# Patient Record
Sex: FEMALE | Race: BLACK OR AFRICAN AMERICAN | NOT HISPANIC OR LATINO | URBAN - METROPOLITAN AREA
[De-identification: names, ages, dates, MRNs, and addresses within clinical notes are randomized per-mention and may not be internally consistent; named-entity substitution may affect disease eponyms.]

---

## 2017-08-03 VITALS
RESPIRATION RATE: 20 BRPM | DIASTOLIC BLOOD PRESSURE: 95 MMHG | WEIGHT: 248.9 LBS | TEMPERATURE: 98 F | HEIGHT: 64 IN | HEART RATE: 97 BPM | OXYGEN SATURATION: 97 % | SYSTOLIC BLOOD PRESSURE: 169 MMHG

## 2017-08-03 NOTE — PATIENT PROFILE ADULT. - TEACHING/LEARNING LEARNING PREFERENCES
individual instruction/verbal instruction/written material written material/skill demonstration/verbal instruction/individual instruction

## 2017-08-03 NOTE — PATIENT PROFILE ADULT. - PSH
Fibroid, uterine    H/O decompression of ulnar nerve Fibroid, uterine  REMOVED  H/O decompression of ulnar nerve  RIGHT ARM

## 2017-08-03 NOTE — PATIENT PROFILE ADULT. - VISION (WITH CORRECTIVE LENSES IF THE PATIENT USUALLY WEARS THEM):
Normal vision: sees adequately in most situations; can see medication labels, newsprint Partially impaired: cannot see medication labels or newsprint, but can see obstacles in path, and the surrounding layout; can count fingers at arm's length/GLASSES

## 2017-08-04 ENCOUNTER — INPATIENT (INPATIENT)
Facility: HOSPITAL | Age: 62
LOS: 1 days | Discharge: HOME HEALTH | DRG: 470 | End: 2017-08-06
Attending: SPECIALIST | Admitting: SPECIALIST
Payer: MEDICARE

## 2017-08-04 DIAGNOSIS — M17.11 UNILATERAL PRIMARY OSTEOARTHRITIS, RIGHT KNEE: ICD-10-CM

## 2017-08-04 DIAGNOSIS — Z98.890 OTHER SPECIFIED POSTPROCEDURAL STATES: Chronic | ICD-10-CM

## 2017-08-04 DIAGNOSIS — D25.9 LEIOMYOMA OF UTERUS, UNSPECIFIED: Chronic | ICD-10-CM

## 2017-08-04 LAB
BASOPHILS NFR BLD AUTO: 0.1 % — SIGNIFICANT CHANGE UP (ref 0–2)
EOSINOPHIL NFR BLD AUTO: 0 % — SIGNIFICANT CHANGE UP (ref 0–6)
HCT VFR BLD CALC: 38.3 % — SIGNIFICANT CHANGE UP (ref 34.5–45)
HGB BLD-MCNC: 12.5 G/DL — SIGNIFICANT CHANGE UP (ref 11.5–15.5)
LYMPHOCYTES # BLD AUTO: 12.2 % — LOW (ref 13–44)
MCHC RBC-ENTMCNC: 28.9 PG — SIGNIFICANT CHANGE UP (ref 27–34)
MCHC RBC-ENTMCNC: 32.6 G/DL — SIGNIFICANT CHANGE UP (ref 32–36)
MCV RBC AUTO: 88.7 FL — SIGNIFICANT CHANGE UP (ref 80–100)
MONOCYTES NFR BLD AUTO: 1.2 % — LOW (ref 2–14)
NEUTROPHILS NFR BLD AUTO: 86.5 % — HIGH (ref 43–77)
PLATELET # BLD AUTO: 289 K/UL — SIGNIFICANT CHANGE UP (ref 150–400)
RBC # BLD: 4.32 M/UL — SIGNIFICANT CHANGE UP (ref 3.8–5.2)
RBC # FLD: 13.8 % — SIGNIFICANT CHANGE UP (ref 10.3–16.9)
WBC # BLD: 8.8 K/UL — SIGNIFICANT CHANGE UP (ref 3.8–10.5)
WBC # FLD AUTO: 8.8 K/UL — SIGNIFICANT CHANGE UP (ref 3.8–10.5)

## 2017-08-04 PROCEDURE — 73560 X-RAY EXAM OF KNEE 1 OR 2: CPT | Mod: 26,RT

## 2017-08-04 RX ORDER — DOCUSATE SODIUM 100 MG
100 CAPSULE ORAL THREE TIMES A DAY
Qty: 0 | Refills: 0 | Status: DISCONTINUED | OUTPATIENT
Start: 2017-08-04 | End: 2017-08-06

## 2017-08-04 RX ORDER — KETOROLAC TROMETHAMINE 30 MG/ML
30 SYRINGE (ML) INJECTION EVERY 6 HOURS
Qty: 0 | Refills: 0 | Status: DISCONTINUED | OUTPATIENT
Start: 2017-08-04 | End: 2017-08-05

## 2017-08-04 RX ORDER — ASPIRIN/CALCIUM CARB/MAGNESIUM 324 MG
325 TABLET ORAL DAILY
Qty: 0 | Refills: 0 | Status: DISCONTINUED | OUTPATIENT
Start: 2017-08-04 | End: 2017-08-05

## 2017-08-04 RX ORDER — AMLODIPINE BESYLATE 2.5 MG/1
5 TABLET ORAL DAILY
Qty: 0 | Refills: 0 | Status: DISCONTINUED | OUTPATIENT
Start: 2017-08-04 | End: 2017-08-06

## 2017-08-04 RX ORDER — CEFAZOLIN SODIUM 1 G
1000 VIAL (EA) INJECTION EVERY 8 HOURS
Qty: 0 | Refills: 0 | Status: COMPLETED | OUTPATIENT
Start: 2017-08-04 | End: 2017-08-05

## 2017-08-04 RX ORDER — BUPIVACAINE 13.3 MG/ML
20 INJECTION, SUSPENSION, LIPOSOMAL INFILTRATION ONCE
Qty: 0 | Refills: 0 | Status: DISCONTINUED | OUTPATIENT
Start: 2017-08-04 | End: 2017-08-06

## 2017-08-04 RX ORDER — SENNA PLUS 8.6 MG/1
2 TABLET ORAL AT BEDTIME
Qty: 0 | Refills: 0 | Status: DISCONTINUED | OUTPATIENT
Start: 2017-08-04 | End: 2017-08-06

## 2017-08-04 RX ORDER — OXYCODONE HYDROCHLORIDE 5 MG/1
10 TABLET ORAL EVERY 4 HOURS
Qty: 0 | Refills: 0 | Status: DISCONTINUED | OUTPATIENT
Start: 2017-08-04 | End: 2017-08-06

## 2017-08-04 RX ORDER — MORPHINE SULFATE 50 MG/1
4 CAPSULE, EXTENDED RELEASE ORAL
Qty: 0 | Refills: 0 | Status: DISCONTINUED | OUTPATIENT
Start: 2017-08-04 | End: 2017-08-04

## 2017-08-04 RX ORDER — ACETAMINOPHEN 500 MG
650 TABLET ORAL EVERY 6 HOURS
Qty: 0 | Refills: 0 | Status: DISCONTINUED | OUTPATIENT
Start: 2017-08-04 | End: 2017-08-06

## 2017-08-04 RX ORDER — HYDROCHLOROTHIAZIDE 25 MG
25 TABLET ORAL DAILY
Qty: 0 | Refills: 0 | Status: DISCONTINUED | OUTPATIENT
Start: 2017-08-04 | End: 2017-08-06

## 2017-08-04 RX ORDER — OXYCODONE HYDROCHLORIDE 5 MG/1
20 TABLET ORAL ONCE
Qty: 0 | Refills: 0 | Status: DISCONTINUED | OUTPATIENT
Start: 2017-08-04 | End: 2017-08-04

## 2017-08-04 RX ORDER — POTASSIUM CHLORIDE 20 MEQ
10 PACKET (EA) ORAL DAILY
Qty: 0 | Refills: 0 | Status: DISCONTINUED | OUTPATIENT
Start: 2017-08-04 | End: 2017-08-06

## 2017-08-04 RX ORDER — ONDANSETRON 8 MG/1
4 TABLET, FILM COATED ORAL EVERY 6 HOURS
Qty: 0 | Refills: 0 | Status: DISCONTINUED | OUTPATIENT
Start: 2017-08-04 | End: 2017-08-06

## 2017-08-04 RX ORDER — OXYCODONE HYDROCHLORIDE 5 MG/1
5 TABLET ORAL EVERY 4 HOURS
Qty: 0 | Refills: 0 | Status: DISCONTINUED | OUTPATIENT
Start: 2017-08-04 | End: 2017-08-06

## 2017-08-04 RX ORDER — SODIUM CHLORIDE 9 MG/ML
1000 INJECTION, SOLUTION INTRAVENOUS
Qty: 0 | Refills: 0 | Status: DISCONTINUED | OUTPATIENT
Start: 2017-08-04 | End: 2017-08-06

## 2017-08-04 RX ORDER — MORPHINE SULFATE 50 MG/1
2 CAPSULE, EXTENDED RELEASE ORAL EVERY 4 HOURS
Qty: 0 | Refills: 0 | Status: DISCONTINUED | OUTPATIENT
Start: 2017-08-04 | End: 2017-08-06

## 2017-08-04 RX ADMIN — Medication 30 MILLIGRAM(S): at 21:45

## 2017-08-04 RX ADMIN — MORPHINE SULFATE 4 MILLIGRAM(S): 50 CAPSULE, EXTENDED RELEASE ORAL at 14:20

## 2017-08-04 RX ADMIN — MORPHINE SULFATE 4 MILLIGRAM(S): 50 CAPSULE, EXTENDED RELEASE ORAL at 14:21

## 2017-08-04 RX ADMIN — MORPHINE SULFATE 4 MILLIGRAM(S): 50 CAPSULE, EXTENDED RELEASE ORAL at 13:59

## 2017-08-04 RX ADMIN — SENNA PLUS 2 TABLET(S): 8.6 TABLET ORAL at 21:45

## 2017-08-04 RX ADMIN — OXYCODONE HYDROCHLORIDE 20 MILLIGRAM(S): 5 TABLET ORAL at 08:14

## 2017-08-04 RX ADMIN — Medication 100 MILLIGRAM(S): at 21:45

## 2017-08-04 RX ADMIN — OXYCODONE HYDROCHLORIDE 10 MILLIGRAM(S): 5 TABLET ORAL at 14:35

## 2017-08-04 RX ADMIN — MORPHINE SULFATE 4 MILLIGRAM(S): 50 CAPSULE, EXTENDED RELEASE ORAL at 13:40

## 2017-08-04 RX ADMIN — Medication 100 MILLIGRAM(S): at 17:05

## 2017-08-04 RX ADMIN — Medication 30 MILLIGRAM(S): at 22:00

## 2017-08-04 RX ADMIN — Medication 325 MILLIGRAM(S): at 21:45

## 2017-08-04 RX ADMIN — MORPHINE SULFATE 4 MILLIGRAM(S): 50 CAPSULE, EXTENDED RELEASE ORAL at 13:58

## 2017-08-04 NOTE — PHYSICAL THERAPY INITIAL EVALUATION ADULT - DIAGNOSIS, PT EVAL
4H: Impaired Joint Mobility, Motor Function, Muscle Performance, and Range of Motion Associated with Joint Arthroplasty
4H: Impaired Joint Mobility, Motor Function, Muscle Performance, and Range of Motion Associated with Joint Arthroplasty

## 2017-08-04 NOTE — H&P ADULT - NSHPLABSRESULTS_GEN_ALL_CORE
Preop CBC, BMP, PT/PTT/INR, UA - WNL per medical clearance   Preop EKG - normal sinus - reviewed by medical clearance   Preop CXR - normal chest - reviewed by medical clearance

## 2017-08-04 NOTE — H&P ADULT - NSHPPHYSICALEXAM_GEN_ALL_CORE
MSK:  +decreased ROM secondary to pain, bilateral knees  Quad/GS/TA/FHL/EHL motor strength 5/5 bilaterally.   Sensation intact and equal to distal bilateral LE.   DP pulses palpable, toes warm and well perfused, cap refill brisk.    Remainder of exam per medical clearance note

## 2017-08-04 NOTE — PHYSICAL THERAPY INITIAL EVALUATION ADULT - ADDITIONAL COMMENTS
Patient is independent with most activities but son helps out with some ADL's. Patient uses a RW as needed and has a flight of stairs to enter her apt.

## 2017-08-04 NOTE — PHYSICAL THERAPY INITIAL EVALUATION ADULT - CRITERIA FOR SKILLED THERAPEUTIC INTERVENTIONS
anticipated discharge recommendation/anticipated equipment needs at discharge/rehab potential/risk reduction/prevention/functional limitations in following categories/impairments found/therapy frequency/predicted duration of therapy intervention

## 2017-08-04 NOTE — H&P ADULT - PROBLEM SELECTOR PLAN 1
Admit to Orthopaedic Service.  Presents today for elective right total knee replacement, left knee steroid injection  Pt medically stable and cleared for procedure today by Dr. Nga Webb MD

## 2017-08-04 NOTE — PHYSICAL THERAPY INITIAL EVALUATION ADULT - PERTINENT HX OF CURRENT PROBLEM, REHAB EVAL
62F c/o right knee pain x 4 years that began spontaneously and without injury or trauma. Pt reports more mild pain in her left knee that started within the past few months. Pt's pain has progressively worsened without improvement and has become increasingly unresponsive to conservative management.
63 yo F with chronic progressively worsening right knee pain x 4years that began spontaneously without injury or trauma

## 2017-08-04 NOTE — PROGRESS NOTE ADULT - SUBJECTIVE AND OBJECTIVE BOX
Orthopaedics Post Op Check    Procedure: Right total knee replacement  Surgeon: Dr. Torrez     Pt comfortable, without complaints  Denies CP, SOB, N/V, numbness/tingling     Vital Signs Last 24 Hrs  T(C): 35.9 (04 Aug 2017 12:40), Max: 36.4 (04 Aug 2017 08:30)  T(F): 96.7 (04 Aug 2017 12:40), Max: 97.6 (04 Aug 2017 08:30)  HR: 96 (04 Aug 2017 14:10) (86 - 97)  BP: 162/86 (04 Aug 2017 14:10) (129/66 - 169/95)  BP(mean): --  RR: 23 (04 Aug 2017 14:10) (16 - 23)  SpO2: 95% (04 Aug 2017 14:10) (94% - 97%)  AVSS, NAD    Dressing C/D/I  General: Pt Alert and oriented     Pulses: DP pulses 2+ bilaterally  Sensation: in tact to distal bilateral lower extremities; decreased sensation to right lower extremity at medial aspect of right foot  Motor: EHL/FHL/TA/GS 5/5 motor strength bilateral lower extremities     Post op XR: Right knee prosthesis in place     A/P: 62yFemale POD#0 s/p right TKR  - Stable  - Pain Control  - DVT ppx:  BID  - Post op abx: Ancef  - PT, WBS: WBAT  - F/U AM Labs

## 2017-08-04 NOTE — H&P ADULT - HISTORY OF PRESENT ILLNESS
62F c/o right knee pain x 4 years that began spontaneously and without injury or trauma. Pt reports more mild pain in her left knee that started within the past few months. Pt's pain has progressively worsened without improvement and has become increasingly unresponsive to conservative management. Pt reports frequent cramping in her right lower extremity. Pt ambulates without the use of an assistive device at baseline. Denies numbness/tingling/weakness of extremities. Denies h/o DVT. Denies fever, chills, CP, SOB, N/V today.     Presents today for elective right total knee replacement, left knee steroid injection.

## 2017-08-04 NOTE — PHYSICAL THERAPY INITIAL EVALUATION ADULT - MANUAL MUSCLE TESTING RESULTS, REHAB EVAL
grossly assessed due to/UE and LE grossly >3+/5 noted by ability to complete functional mobility tasks

## 2017-08-05 LAB
ANION GAP SERPL CALC-SCNC: 14 MMOL/L — SIGNIFICANT CHANGE UP (ref 5–17)
BUN SERPL-MCNC: 12 MG/DL — SIGNIFICANT CHANGE UP (ref 7–23)
CALCIUM SERPL-MCNC: 9.1 MG/DL — SIGNIFICANT CHANGE UP (ref 8.4–10.5)
CHLORIDE SERPL-SCNC: 100 MMOL/L — SIGNIFICANT CHANGE UP (ref 96–108)
CO2 SERPL-SCNC: 26 MMOL/L — SIGNIFICANT CHANGE UP (ref 22–31)
CREAT SERPL-MCNC: 0.7 MG/DL — SIGNIFICANT CHANGE UP (ref 0.5–1.3)
GLUCOSE SERPL-MCNC: 142 MG/DL — HIGH (ref 70–99)
HCT VFR BLD CALC: 35.6 % — SIGNIFICANT CHANGE UP (ref 34.5–45)
HGB BLD-MCNC: 11.6 G/DL — SIGNIFICANT CHANGE UP (ref 11.5–15.5)
MCHC RBC-ENTMCNC: 28.8 PG — SIGNIFICANT CHANGE UP (ref 27–34)
MCHC RBC-ENTMCNC: 32.6 G/DL — SIGNIFICANT CHANGE UP (ref 32–36)
MCV RBC AUTO: 88.3 FL — SIGNIFICANT CHANGE UP (ref 80–100)
MRSA PCR RESULT.: NEGATIVE — SIGNIFICANT CHANGE UP
PLATELET # BLD AUTO: 301 K/UL — SIGNIFICANT CHANGE UP (ref 150–400)
POTASSIUM SERPL-MCNC: 3.8 MMOL/L — SIGNIFICANT CHANGE UP (ref 3.5–5.3)
POTASSIUM SERPL-SCNC: 3.8 MMOL/L — SIGNIFICANT CHANGE UP (ref 3.5–5.3)
RBC # BLD: 4.03 M/UL — SIGNIFICANT CHANGE UP (ref 3.8–5.2)
RBC # FLD: 13.9 % — SIGNIFICANT CHANGE UP (ref 10.3–16.9)
S AUREUS DNA NOSE QL NAA+PROBE: NEGATIVE — SIGNIFICANT CHANGE UP
SODIUM SERPL-SCNC: 140 MMOL/L — SIGNIFICANT CHANGE UP (ref 135–145)
WBC # BLD: 11.4 K/UL — HIGH (ref 3.8–10.5)
WBC # FLD AUTO: 11.4 K/UL — HIGH (ref 3.8–10.5)

## 2017-08-05 RX ORDER — POTASSIUM CHLORIDE 20 MEQ
20 PACKET (EA) ORAL ONCE
Qty: 0 | Refills: 0 | Status: COMPLETED | OUTPATIENT
Start: 2017-08-05 | End: 2017-08-05

## 2017-08-05 RX ORDER — ASPIRIN/CALCIUM CARB/MAGNESIUM 324 MG
325 TABLET ORAL
Qty: 0 | Refills: 0 | Status: DISCONTINUED | OUTPATIENT
Start: 2017-08-05 | End: 2017-08-06

## 2017-08-05 RX ADMIN — MORPHINE SULFATE 2 MILLIGRAM(S): 50 CAPSULE, EXTENDED RELEASE ORAL at 18:19

## 2017-08-05 RX ADMIN — Medication 100 MILLIGRAM(S): at 20:16

## 2017-08-05 RX ADMIN — OXYCODONE HYDROCHLORIDE 5 MILLIGRAM(S): 5 TABLET ORAL at 08:44

## 2017-08-05 RX ADMIN — Medication 20 MILLIEQUIVALENT(S): at 20:47

## 2017-08-05 RX ADMIN — Medication 1 TABLET(S): at 12:06

## 2017-08-05 RX ADMIN — Medication 325 MILLIGRAM(S): at 12:06

## 2017-08-05 RX ADMIN — Medication 100 MILLIGRAM(S): at 01:10

## 2017-08-05 RX ADMIN — OXYCODONE HYDROCHLORIDE 5 MILLIGRAM(S): 5 TABLET ORAL at 09:44

## 2017-08-05 RX ADMIN — OXYCODONE HYDROCHLORIDE 5 MILLIGRAM(S): 5 TABLET ORAL at 16:22

## 2017-08-05 RX ADMIN — Medication 100 MILLIGRAM(S): at 05:38

## 2017-08-05 RX ADMIN — SENNA PLUS 2 TABLET(S): 8.6 TABLET ORAL at 20:17

## 2017-08-05 RX ADMIN — OXYCODONE HYDROCHLORIDE 10 MILLIGRAM(S): 5 TABLET ORAL at 20:03

## 2017-08-05 RX ADMIN — Medication 100 MILLIGRAM(S): at 13:26

## 2017-08-05 RX ADMIN — Medication 25 MILLIGRAM(S): at 05:38

## 2017-08-05 RX ADMIN — Medication 650 MILLIGRAM(S): at 17:28

## 2017-08-05 RX ADMIN — Medication 650 MILLIGRAM(S): at 18:28

## 2017-08-05 RX ADMIN — OXYCODONE HYDROCHLORIDE 10 MILLIGRAM(S): 5 TABLET ORAL at 19:02

## 2017-08-05 RX ADMIN — MORPHINE SULFATE 2 MILLIGRAM(S): 50 CAPSULE, EXTENDED RELEASE ORAL at 18:47

## 2017-08-05 RX ADMIN — Medication 10 MILLIEQUIVALENT(S): at 12:07

## 2017-08-05 RX ADMIN — Medication 30 MILLIGRAM(S): at 05:58

## 2017-08-05 RX ADMIN — Medication 325 MILLIGRAM(S): at 20:17

## 2017-08-05 RX ADMIN — AMLODIPINE BESYLATE 5 MILLIGRAM(S): 2.5 TABLET ORAL at 05:38

## 2017-08-05 RX ADMIN — Medication 30 MILLIGRAM(S): at 05:40

## 2017-08-05 RX ADMIN — OXYCODONE HYDROCHLORIDE 5 MILLIGRAM(S): 5 TABLET ORAL at 15:22

## 2017-08-05 NOTE — PROGRESS NOTE ADULT - SUBJECTIVE AND OBJECTIVE BOX
POST OPERATIVE DAY #:  1  STATUS POST:  Right  TKA                        SUBJECTIVE: Patient seen and examined. Doing well. Walked with PT yesterday      OBJECTIVE:     Vital Signs Last 24 Hrs  T(C): 35.8 (05 Aug 2017 05:30), Max: 36.6 (05 Aug 2017 00:15)  T(F): 96.5 (05 Aug 2017 05:30), Max: 97.9 (05 Aug 2017 00:15)  HR: 70 (05 Aug 2017 05:30) (70 - 97)  BP: 153/74 (05 Aug 2017 05:30) (129/66 - 183/94)  BP(mean): --  RR: 18 (05 Aug 2017 05:30) (16 - 23)  SpO2: 99% (05 Aug 2017 05:30) (94% - 99%)    Affected extremity:          Dressing: [ ] clean/dry/intact  [ ] Other:           Sensation: [ ] intact to light touch  [ ] decreased:          Motor exam: [  ] / 5 Tibialis Anterior/Gastrocnemius-Soleus          [ ] warm well perfused; capillary refill <3 seconds     LABS:                        12.5   8.8   )-----------( 289      ( 04 Aug 2017 13:50 )             38.3                 MEDICATIONS:BUpivacaine liposome 1.3% Injectable (no eMAR) 20 milliLiter(s) Local Injection once  lactated ringers. 1000 milliLiter(s) IV Continuous <Continuous>  acetaminophen   Tablet 650 milliGRAM(s) Oral every 6 hours PRN  acetaminophen   Tablet. 650 milliGRAM(s) Oral every 6 hours PRN  oxyCODONE    IR 5 milliGRAM(s) Oral every 4 hours PRN  oxyCODONE    IR 10 milliGRAM(s) Oral every 4 hours PRN  morphine  - Injectable 2 milliGRAM(s) IV Push every 4 hours PRN  aluminum hydroxide/magnesium hydroxide/simethicone Suspension 30 milliLiter(s) Oral four times a day PRN  ondansetron Injectable 4 milliGRAM(s) IV Push every 6 hours PRN  senna 2 Tablet(s) Oral at bedtime PRN  docusate sodium 100 milliGRAM(s) Oral three times a day  multivitamin 1 Tablet(s) Oral daily  aspirin enteric coated 325 milliGRAM(s) Oral daily  amLODIPine   Tablet 5 milliGRAM(s) Oral daily  hydrochlorothiazide 25 milliGRAM(s) Oral daily  potassium chloride    Tablet ER 10 milliEquivalent(s) Oral daily    Anticoagulation:  aspirin enteric coated 325 milliGRAM(s) Oral daily      Antibiotics:       Pain medications:   acetaminophen   Tablet 650 milliGRAM(s) Oral every 6 hours PRN  acetaminophen   Tablet. 650 milliGRAM(s) Oral every 6 hours PRN  oxyCODONE    IR 5 milliGRAM(s) Oral every 4 hours PRN  oxyCODONE    IR 10 milliGRAM(s) Oral every 4 hours PRN  morphine  - Injectable 2 milliGRAM(s) IV Push every 4 hours PRN  ondansetron Injectable 4 milliGRAM(s) IV Push every 6 hours PRN      RADIOLOGY & ADDITIONAL STUDIES:    A/P :   s/p right total knee replacement  -    Pain control  -    DVT ppx: [id number]   -    Periop abx:  Ancef  -    Weight bearing status: WBAT   -    Resume home meds  -    Physical Therapy  -    Dispo: Home

## 2017-08-06 ENCOUNTER — TRANSCRIPTION ENCOUNTER (OUTPATIENT)
Age: 62
End: 2017-08-06

## 2017-08-06 VITALS
DIASTOLIC BLOOD PRESSURE: 85 MMHG | OXYGEN SATURATION: 97 % | TEMPERATURE: 97 F | SYSTOLIC BLOOD PRESSURE: 185 MMHG | RESPIRATION RATE: 15 BRPM | HEART RATE: 76 BPM

## 2017-08-06 LAB
ANION GAP SERPL CALC-SCNC: 13 MMOL/L — SIGNIFICANT CHANGE UP (ref 5–17)
BUN SERPL-MCNC: 14 MG/DL — SIGNIFICANT CHANGE UP (ref 7–23)
CALCIUM SERPL-MCNC: 9 MG/DL — SIGNIFICANT CHANGE UP (ref 8.4–10.5)
CHLORIDE SERPL-SCNC: 98 MMOL/L — SIGNIFICANT CHANGE UP (ref 96–108)
CO2 SERPL-SCNC: 29 MMOL/L — SIGNIFICANT CHANGE UP (ref 22–31)
CREAT SERPL-MCNC: 0.9 MG/DL — SIGNIFICANT CHANGE UP (ref 0.5–1.3)
GLUCOSE SERPL-MCNC: 146 MG/DL — HIGH (ref 70–99)
HCT VFR BLD CALC: 35.1 % — SIGNIFICANT CHANGE UP (ref 34.5–45)
HGB BLD-MCNC: 11.5 G/DL — SIGNIFICANT CHANGE UP (ref 11.5–15.5)
MCHC RBC-ENTMCNC: 29.2 PG — SIGNIFICANT CHANGE UP (ref 27–34)
MCHC RBC-ENTMCNC: 32.8 G/DL — SIGNIFICANT CHANGE UP (ref 32–36)
MCV RBC AUTO: 89.1 FL — SIGNIFICANT CHANGE UP (ref 80–100)
PLATELET # BLD AUTO: 276 K/UL — SIGNIFICANT CHANGE UP (ref 150–400)
POTASSIUM SERPL-MCNC: 4.1 MMOL/L — SIGNIFICANT CHANGE UP (ref 3.5–5.3)
POTASSIUM SERPL-SCNC: 4.1 MMOL/L — SIGNIFICANT CHANGE UP (ref 3.5–5.3)
RBC # BLD: 3.94 M/UL — SIGNIFICANT CHANGE UP (ref 3.8–5.2)
RBC # FLD: 14.2 % — SIGNIFICANT CHANGE UP (ref 10.3–16.9)
SODIUM SERPL-SCNC: 140 MMOL/L — SIGNIFICANT CHANGE UP (ref 135–145)
WBC # BLD: 9.9 K/UL — SIGNIFICANT CHANGE UP (ref 3.8–10.5)
WBC # FLD AUTO: 9.9 K/UL — SIGNIFICANT CHANGE UP (ref 3.8–10.5)

## 2017-08-06 RX ORDER — MAGNESIUM HYDROXIDE 400 MG/1
30 TABLET, CHEWABLE ORAL DAILY
Qty: 0 | Refills: 0 | Status: DISCONTINUED | OUTPATIENT
Start: 2017-08-06 | End: 2017-08-06

## 2017-08-06 RX ORDER — OXYCODONE HYDROCHLORIDE 5 MG/1
1 TABLET ORAL
Qty: 42 | Refills: 0 | OUTPATIENT
Start: 2017-08-06 | End: 2017-08-13

## 2017-08-06 RX ORDER — MELOXICAM 15 MG/1
1 TABLET ORAL
Qty: 14 | Refills: 0 | OUTPATIENT
Start: 2017-08-06 | End: 2017-08-20

## 2017-08-06 RX ORDER — ASPIRIN/CALCIUM CARB/MAGNESIUM 324 MG
1 TABLET ORAL
Qty: 60 | Refills: 0 | OUTPATIENT
Start: 2017-08-06 | End: 2017-09-05

## 2017-08-06 RX ADMIN — OXYCODONE HYDROCHLORIDE 10 MILLIGRAM(S): 5 TABLET ORAL at 07:30

## 2017-08-06 RX ADMIN — Medication 325 MILLIGRAM(S): at 11:32

## 2017-08-06 RX ADMIN — OXYCODONE HYDROCHLORIDE 10 MILLIGRAM(S): 5 TABLET ORAL at 11:32

## 2017-08-06 RX ADMIN — Medication 100 MILLIGRAM(S): at 11:32

## 2017-08-06 RX ADMIN — Medication 1 TABLET(S): at 11:32

## 2017-08-06 RX ADMIN — OXYCODONE HYDROCHLORIDE 10 MILLIGRAM(S): 5 TABLET ORAL at 06:40

## 2017-08-06 RX ADMIN — AMLODIPINE BESYLATE 5 MILLIGRAM(S): 2.5 TABLET ORAL at 06:33

## 2017-08-06 RX ADMIN — Medication 25 MILLIGRAM(S): at 06:33

## 2017-08-06 RX ADMIN — OXYCODONE HYDROCHLORIDE 10 MILLIGRAM(S): 5 TABLET ORAL at 03:20

## 2017-08-06 RX ADMIN — Medication 10 MILLIEQUIVALENT(S): at 11:32

## 2017-08-06 RX ADMIN — Medication 100 MILLIGRAM(S): at 06:33

## 2017-08-06 RX ADMIN — OXYCODONE HYDROCHLORIDE 10 MILLIGRAM(S): 5 TABLET ORAL at 02:28

## 2017-08-06 NOTE — DISCHARGE NOTE ADULT - PATIENT PORTAL LINK FT
“You can access the [facility] Patient Portal, offered by [facility], by registering with the following website: [email or web address]”

## 2017-08-06 NOTE — PROGRESS NOTE ADULT - SUBJECTIVE AND OBJECTIVE BOX
POST OPERATIVE DAY #:  2  STATUS POST:  Right  TKA                        SUBJECTIVE: Patient seen and examined. Doing well. Walked with PT yesterday, expected to cleat PT today      OBJECTIVE:     Vital Signs Last 24 Hrs  T(C): 36.6 (06 Aug 2017 05:32), Max: 36.8 (05 Aug 2017 20:00)  T(F): 97.8 (06 Aug 2017 05:32), Max: 98.2 (05 Aug 2017 20:00)  HR: 74 (06 Aug 2017 05:32) (71 - 77)  BP: 159/91 (06 Aug 2017 05:32) (154/81 - 173/81)  BP(mean): --  RR: 18 (06 Aug 2017 05:32) (16 - 18)  SpO2: 97% (06 Aug 2017 05:32) (95% - 99%)    Affected extremity:          Dressing: [x] clean/dry/intact  [ ] Other:           Sensation: [x] intact to light touch  [ ] decreased:          Motor exam: [ 5] / 5 Tibialis Anterior/Gastrocnemius-Soleus          [x] warm well perfused; capillary refill <3 seconds     LABS:                                   11.5   9.9   )-----------( 276      ( 06 Aug 2017 06:56 )             35.1         08-06    140  |  98  |  14  ----------------------------<  146<H>  4.1   |  29  |  0.90    Ca    9.0      06 Aug 2017 06:56            MEDICATIONS:BUpivacaine liposome 1.3% Injectable (no eMAR) 20 milliLiter(s) Local Injection once  lactated ringers. 1000 milliLiter(s) IV Continuous <Continuous>  acetaminophen   Tablet 650 milliGRAM(s) Oral every 6 hours PRN  acetaminophen   Tablet. 650 milliGRAM(s) Oral every 6 hours PRN  oxyCODONE    IR 5 milliGRAM(s) Oral every 4 hours PRN  oxyCODONE    IR 10 milliGRAM(s) Oral every 4 hours PRN  morphine  - Injectable 2 milliGRAM(s) IV Push every 4 hours PRN  aluminum hydroxide/magnesium hydroxide/simethicone Suspension 30 milliLiter(s) Oral four times a day PRN  ondansetron Injectable 4 milliGRAM(s) IV Push every 6 hours PRN  senna 2 Tablet(s) Oral at bedtime PRN  docusate sodium 100 milliGRAM(s) Oral three times a day  multivitamin 1 Tablet(s) Oral daily  aspirin enteric coated 325 milliGRAM(s) Oral daily  amLODIPine   Tablet 5 milliGRAM(s) Oral daily  hydrochlorothiazide 25 milliGRAM(s) Oral daily  potassium chloride    Tablet ER 10 milliEquivalent(s) Oral daily    Anticoagulation:  aspirin enteric coated 325 milliGRAM(s) Oral daily      Antibiotics:       Pain medications:   acetaminophen   Tablet 650 milliGRAM(s) Oral every 6 hours PRN  acetaminophen   Tablet. 650 milliGRAM(s) Oral every 6 hours PRN  oxyCODONE    IR 5 milliGRAM(s) Oral every 4 hours PRN  oxyCODONE    IR 10 milliGRAM(s) Oral every 4 hours PRN  morphine  - Injectable 2 milliGRAM(s) IV Push every 4 hours PRN  ondansetron Injectable 4 milliGRAM(s) IV Push every 6 hours PRN      RADIOLOGY & ADDITIONAL STUDIES:    A/P :   s/p right total knee replacement POD 2  -    Pain control  -    DVT ppx: [id number]   -    Periop abx:  Ancef  -    Weight bearing status: WBAT   -    Resume home meds  -    Physical Therapy  -    Dispo: Home

## 2017-08-06 NOTE — DISCHARGE NOTE ADULT - PLAN OF CARE
Improved function and pain No strenuous activity, heavy lifting, driving or returning to work until cleared by MD.  You may shower - dressing is water-resistant, no soaking in bathtubs.  Remove dressing after post op day 5-7, then leave incision open to air. Keep incision clean and dry.  Try to have regular bowel movements, take stool softener or laxative if necessary.  May take Pepcid or Zantac for upset stomach.  May take Aleve or Naproxen instead of Meloxicam.  Swelling may travel all the way down leg to foot, this is normal and will subside in a few weeks.  Call to schedule an appt with  for follow up, if you have staples or sutures they will be removed in office.  Contact your doctor if you experience: fever greater than 101.5, chills, chest pain, difficulty breathing, redness or excessive drainage around the incision, other concerns.

## 2017-08-06 NOTE — DISCHARGE NOTE ADULT - MEDICATION SUMMARY - MEDICATIONS TO TAKE
I will START or STAY ON the medications listed below when I get home from the hospital:    Aspirin Enteric Coated 325 mg oral delayed release tablet  -- 1 tab(s) by mouth 2 times a day  -- Swallow whole.  Do not crush.  Take with food or milk.    -- Indication: For Anti coagulation    oxyCODONE 5 mg oral tablet  -- 1 tab(s) by mouth every 4 hours MDD:8  8  -- Caution federal law prohibits the transfer of this drug to any person other  than the person for whom it was prescribed.  It is very important that you take or use this exactly as directed.  Do not skip doses or discontinue unless directed by your doctor.  May cause drowsiness.  Alcohol may intensify this effect.  Use care when operating dangerous machinery.  This prescription cannot be refilled.  Using more of this medication than prescribed may cause serious breathing problems.    -- Indication: For severe pain    meloxicam 15 mg oral tablet  -- 1 tab(s) by mouth once a day MDD:1 as needed for moderate pain  -- Do not take this drug if you are pregnant.  Obtain medical advice before taking any non-prescription drugs as some may affect the action of this medication.  Take with food or milk.    -- Indication: For Moderate pain    amLODIPine 5 mg oral tablet  -- 1 tab(s) by mouth once a day  -- Indication: For High blood pressure    hydroCHLOROthiazide 25 mg oral tablet  -- 1 tab(s) by mouth once a day  -- Indication: For High blood pressure    potassium chloride 10 mEq oral tablet, extended release  --  by mouth once a day  -- Indication: For supplement

## 2017-08-06 NOTE — DISCHARGE NOTE ADULT - CARE PLAN
Principal Discharge DX:	Primary osteoarthritis of right knee  Goal:	Improved function and pain  Instructions for follow-up, activity and diet:	No strenuous activity, heavy lifting, driving or returning to work until cleared by MD.  You may shower - dressing is water-resistant, no soaking in bathtubs.  Remove dressing after post op day 5-7, then leave incision open to air. Keep incision clean and dry.  Try to have regular bowel movements, take stool softener or laxative if necessary.  May take Pepcid or Zantac for upset stomach.  May take Aleve or Naproxen instead of Meloxicam.  Swelling may travel all the way down leg to foot, this is normal and will subside in a few weeks.  Call to schedule an appt with  for follow up, if you have staples or sutures they will be removed in office.  Contact your doctor if you experience: fever greater than 101.5, chills, chest pain, difficulty breathing, redness or excessive drainage around the incision, other concerns.

## 2017-08-06 NOTE — DISCHARGE NOTE ADULT - CARE PROVIDER_API CALL
Apolinar Torrez), Orthopedics  [address] [place] [id number]  Phone: [phone]  Fax: [phone]

## 2017-08-08 DIAGNOSIS — M25.562 PAIN IN LEFT KNEE: ICD-10-CM

## 2017-08-08 DIAGNOSIS — E66.9 OBESITY, UNSPECIFIED: ICD-10-CM

## 2017-08-08 DIAGNOSIS — F32.9 MAJOR DEPRESSIVE DISORDER, SINGLE EPISODE, UNSPECIFIED: ICD-10-CM

## 2017-08-08 DIAGNOSIS — M17.11 UNILATERAL PRIMARY OSTEOARTHRITIS, RIGHT KNEE: ICD-10-CM

## 2017-08-08 DIAGNOSIS — F41.9 ANXIETY DISORDER, UNSPECIFIED: ICD-10-CM

## 2017-08-08 DIAGNOSIS — I10 ESSENTIAL (PRIMARY) HYPERTENSION: ICD-10-CM

## 2017-08-08 DIAGNOSIS — Z87.891 PERSONAL HISTORY OF NICOTINE DEPENDENCE: ICD-10-CM

## 2017-08-08 LAB — SURGICAL PATHOLOGY STUDY: SIGNIFICANT CHANGE UP

## 2017-08-08 PROCEDURE — 87641 MR-STAPH DNA AMP PROBE: CPT

## 2017-08-08 PROCEDURE — 86850 RBC ANTIBODY SCREEN: CPT

## 2017-08-08 PROCEDURE — 86901 BLOOD TYPING SEROLOGIC RH(D): CPT

## 2017-08-08 PROCEDURE — 73560 X-RAY EXAM OF KNEE 1 OR 2: CPT

## 2017-08-08 PROCEDURE — 88300 SURGICAL PATH GROSS: CPT

## 2017-08-08 PROCEDURE — C1713: CPT

## 2017-08-08 PROCEDURE — 97530 THERAPEUTIC ACTIVITIES: CPT

## 2017-08-08 PROCEDURE — 97116 GAIT TRAINING THERAPY: CPT

## 2017-08-08 PROCEDURE — 36415 COLL VENOUS BLD VENIPUNCTURE: CPT

## 2017-08-08 PROCEDURE — 85027 COMPLETE CBC AUTOMATED: CPT

## 2017-08-08 PROCEDURE — 97161 PT EVAL LOW COMPLEX 20 MIN: CPT

## 2017-08-08 PROCEDURE — 85025 COMPLETE CBC W/AUTO DIFF WBC: CPT

## 2017-08-08 PROCEDURE — C1776: CPT

## 2017-08-08 PROCEDURE — 80048 BASIC METABOLIC PNL TOTAL CA: CPT

## 2017-08-08 PROCEDURE — 86900 BLOOD TYPING SEROLOGIC ABO: CPT

## 2017-08-08 PROCEDURE — 97110 THERAPEUTIC EXERCISES: CPT

## 2017-12-28 PROBLEM — F41.9 ANXIETY DISORDER, UNSPECIFIED: Chronic | Status: ACTIVE | Noted: 2017-08-03

## 2017-12-28 PROBLEM — I10 ESSENTIAL (PRIMARY) HYPERTENSION: Chronic | Status: ACTIVE | Noted: 2017-08-03

## 2018-03-29 RX ORDER — OXYCODONE HYDROCHLORIDE 5 MG/1
20 TABLET ORAL ONCE
Qty: 0 | Refills: 0 | Status: DISCONTINUED | OUTPATIENT
Start: 2018-03-30 | End: 2018-03-30

## 2018-03-29 RX ORDER — AMLODIPINE BESYLATE 2.5 MG/1
1 TABLET ORAL
Qty: 0 | Refills: 0 | COMMUNITY

## 2018-03-29 NOTE — H&P ADULT - PSH
Fibroid, uterine  REMOVED  H/O decompression of ulnar nerve  RIGHT ARM  H/O total knee replacement, right Fibroid, uterine  REMOVED  H/O decompression of ulnar nerve  RIGHT ARM  H/O total knee replacement, right    History of total knee replacement, right

## 2018-03-29 NOTE — H&P ADULT - NSHPLABSRESULTS_GEN_ALL_CORE
preop ekg - SInus rhythm, non-specific ST changes per clearance    preop cbc, bmp, coags, UA WNL    CXR -WNL Preop cbc, bmp, pt/inr, ptt, ua wnl; nasal swab dos  preop cxr wnl per clearance  preop ekg wnl per clearance

## 2018-03-29 NOTE — H&P ADULT - PMH
Anxiety and depression    HTN (hypertension)    Obesity    Vitamin D deficiency Anxiety and depression    HTN (hypertension)    Obesity    Osteoarthritis of both knees    Vitamin D deficiency

## 2018-03-29 NOTE — PATIENT PROFILE ADULT. - PSH
Fibroid, uterine  REMOVED  H/O decompression of ulnar nerve  RIGHT ARM  H/O total knee replacement, right    History of total knee replacement, right

## 2018-03-29 NOTE — H&P ADULT - PROBLEM SELECTOR PLAN 1
Admit to ortho for LEft TKA surgery. Medically cleared. Admit to Ortho  For elective Left TKR  Cleared by Dr. Webb

## 2018-03-29 NOTE — PATIENT PROFILE ADULT. - PMH
Anxiety and depression    HTN (hypertension)    Obesity    Osteoarthritis of both knees    Vitamin D deficiency

## 2018-03-29 NOTE — H&P ADULT - NSHPPHYSICALEXAM_GEN_ALL_CORE
MSK:  limited ROM left knee secondary to pain    Rest of PE per medical clearance Left LE: Decreased ROM secondary to pain, sensation intact, DP 2+, brisk cap refill, EHL/FHL/TA/GS 5/5  Rest of PE per MD clearance

## 2018-03-29 NOTE — H&P ADULT - HISTORY OF PRESENT ILLNESS
62 y.o F with left knee pain x     Presents for elective left total knee replacement surgery. HAs had conservative treatment but still symptomatic. 62 y.o F with left knee pain worsened over time without improvement. Denies numbness, tingling. Ambulates with cane. Presents today for elective Left TKR.

## 2018-03-30 ENCOUNTER — INPATIENT (INPATIENT)
Facility: HOSPITAL | Age: 63
LOS: 2 days | Discharge: HOME HEALTH | DRG: 470 | End: 2018-04-02
Attending: SPECIALIST | Admitting: SPECIALIST
Payer: MEDICARE

## 2018-03-30 ENCOUNTER — RESULT REVIEW (OUTPATIENT)
Age: 63
End: 2018-03-30

## 2018-03-30 ENCOUNTER — TRANSCRIPTION ENCOUNTER (OUTPATIENT)
Age: 63
End: 2018-03-30

## 2018-03-30 VITALS
HEIGHT: 65 IN | WEIGHT: 252.43 LBS | RESPIRATION RATE: 18 BRPM | DIASTOLIC BLOOD PRESSURE: 82 MMHG | SYSTOLIC BLOOD PRESSURE: 134 MMHG | TEMPERATURE: 98 F | HEART RATE: 95 BPM

## 2018-03-30 DIAGNOSIS — Z96.651 PRESENCE OF RIGHT ARTIFICIAL KNEE JOINT: Chronic | ICD-10-CM

## 2018-03-30 DIAGNOSIS — M17.12 UNILATERAL PRIMARY OSTEOARTHRITIS, LEFT KNEE: ICD-10-CM

## 2018-03-30 DIAGNOSIS — I10 ESSENTIAL (PRIMARY) HYPERTENSION: ICD-10-CM

## 2018-03-30 DIAGNOSIS — D25.9 LEIOMYOMA OF UTERUS, UNSPECIFIED: Chronic | ICD-10-CM

## 2018-03-30 DIAGNOSIS — Z98.890 OTHER SPECIFIED POSTPROCEDURAL STATES: Chronic | ICD-10-CM

## 2018-03-30 DIAGNOSIS — F41.8 OTHER SPECIFIED ANXIETY DISORDERS: ICD-10-CM

## 2018-03-30 LAB
MRSA PCR RESULT.: NEGATIVE — SIGNIFICANT CHANGE UP
S AUREUS DNA NOSE QL NAA+PROBE: NEGATIVE — SIGNIFICANT CHANGE UP

## 2018-03-30 PROCEDURE — 73560 X-RAY EXAM OF KNEE 1 OR 2: CPT | Mod: 26,LT

## 2018-03-30 RX ORDER — PANTOPRAZOLE SODIUM 20 MG/1
40 TABLET, DELAYED RELEASE ORAL DAILY
Qty: 0 | Refills: 0 | Status: DISCONTINUED | OUTPATIENT
Start: 2018-03-30 | End: 2018-04-02

## 2018-03-30 RX ORDER — MORPHINE SULFATE 50 MG/1
2 CAPSULE, EXTENDED RELEASE ORAL
Qty: 0 | Refills: 0 | Status: DISCONTINUED | OUTPATIENT
Start: 2018-03-30 | End: 2018-04-02

## 2018-03-30 RX ORDER — BUPIVACAINE 13.3 MG/ML
20 INJECTION, SUSPENSION, LIPOSOMAL INFILTRATION ONCE
Qty: 0 | Refills: 0 | Status: DISCONTINUED | OUTPATIENT
Start: 2018-03-30 | End: 2018-04-02

## 2018-03-30 RX ORDER — HYDROMORPHONE HYDROCHLORIDE 2 MG/ML
0.5 INJECTION INTRAMUSCULAR; INTRAVENOUS; SUBCUTANEOUS
Qty: 0 | Refills: 0 | Status: DISCONTINUED | OUTPATIENT
Start: 2018-03-30 | End: 2018-04-02

## 2018-03-30 RX ORDER — DOCUSATE SODIUM 100 MG
100 CAPSULE ORAL THREE TIMES A DAY
Qty: 0 | Refills: 0 | Status: DISCONTINUED | OUTPATIENT
Start: 2018-03-30 | End: 2018-04-02

## 2018-03-30 RX ORDER — ASPIRIN/CALCIUM CARB/MAGNESIUM 324 MG
325 TABLET ORAL
Qty: 0 | Refills: 0 | Status: DISCONTINUED | OUTPATIENT
Start: 2018-03-30 | End: 2018-04-02

## 2018-03-30 RX ORDER — SODIUM CHLORIDE 9 MG/ML
1000 INJECTION, SOLUTION INTRAVENOUS
Qty: 0 | Refills: 0 | Status: DISCONTINUED | OUTPATIENT
Start: 2018-03-30 | End: 2018-04-02

## 2018-03-30 RX ORDER — HYDROMORPHONE HYDROCHLORIDE 2 MG/ML
0.5 INJECTION INTRAMUSCULAR; INTRAVENOUS; SUBCUTANEOUS EVERY 4 HOURS
Qty: 0 | Refills: 0 | Status: DISCONTINUED | OUTPATIENT
Start: 2018-03-30 | End: 2018-03-30

## 2018-03-30 RX ORDER — MAGNESIUM HYDROXIDE 400 MG/1
30 TABLET, CHEWABLE ORAL DAILY
Qty: 0 | Refills: 0 | Status: DISCONTINUED | OUTPATIENT
Start: 2018-03-30 | End: 2018-04-02

## 2018-03-30 RX ORDER — OXYCODONE HYDROCHLORIDE 5 MG/1
10 TABLET ORAL EVERY 12 HOURS
Qty: 0 | Refills: 0 | Status: DISCONTINUED | OUTPATIENT
Start: 2018-03-30 | End: 2018-04-02

## 2018-03-30 RX ORDER — POLYETHYLENE GLYCOL 3350 17 G/17G
17 POWDER, FOR SOLUTION ORAL DAILY
Qty: 0 | Refills: 0 | Status: DISCONTINUED | OUTPATIENT
Start: 2018-03-30 | End: 2018-04-02

## 2018-03-30 RX ORDER — KETOROLAC TROMETHAMINE 30 MG/ML
15 SYRINGE (ML) INJECTION EVERY 8 HOURS
Qty: 0 | Refills: 0 | Status: DISCONTINUED | OUTPATIENT
Start: 2018-03-30 | End: 2018-03-31

## 2018-03-30 RX ORDER — CEFAZOLIN SODIUM 1 G
2000 VIAL (EA) INJECTION EVERY 8 HOURS
Qty: 0 | Refills: 0 | Status: COMPLETED | OUTPATIENT
Start: 2018-03-30 | End: 2018-03-30

## 2018-03-30 RX ORDER — OXYCODONE HYDROCHLORIDE 5 MG/1
10 TABLET ORAL EVERY 4 HOURS
Qty: 0 | Refills: 0 | Status: DISCONTINUED | OUTPATIENT
Start: 2018-03-30 | End: 2018-04-02

## 2018-03-30 RX ORDER — ONDANSETRON 8 MG/1
4 TABLET, FILM COATED ORAL EVERY 6 HOURS
Qty: 0 | Refills: 0 | Status: DISCONTINUED | OUTPATIENT
Start: 2018-03-30 | End: 2018-04-02

## 2018-03-30 RX ORDER — OXYCODONE HYDROCHLORIDE 5 MG/1
5 TABLET ORAL EVERY 4 HOURS
Qty: 0 | Refills: 0 | Status: DISCONTINUED | OUTPATIENT
Start: 2018-03-30 | End: 2018-04-02

## 2018-03-30 RX ORDER — ACETAMINOPHEN 500 MG
650 TABLET ORAL EVERY 6 HOURS
Qty: 0 | Refills: 0 | Status: DISCONTINUED | OUTPATIENT
Start: 2018-03-30 | End: 2018-04-02

## 2018-03-30 RX ORDER — SENNA PLUS 8.6 MG/1
2 TABLET ORAL AT BEDTIME
Qty: 0 | Refills: 0 | Status: DISCONTINUED | OUTPATIENT
Start: 2018-03-30 | End: 2018-04-02

## 2018-03-30 RX ADMIN — Medication 15 MILLIGRAM(S): at 21:40

## 2018-03-30 RX ADMIN — OXYCODONE HYDROCHLORIDE 10 MILLIGRAM(S): 5 TABLET ORAL at 18:29

## 2018-03-30 RX ADMIN — Medication 15 MILLIGRAM(S): at 21:25

## 2018-03-30 RX ADMIN — Medication 100 MILLIGRAM(S): at 23:55

## 2018-03-30 RX ADMIN — HYDROMORPHONE HYDROCHLORIDE 0.5 MILLIGRAM(S): 2 INJECTION INTRAMUSCULAR; INTRAVENOUS; SUBCUTANEOUS at 12:05

## 2018-03-30 RX ADMIN — MORPHINE SULFATE 2 MILLIGRAM(S): 50 CAPSULE, EXTENDED RELEASE ORAL at 11:51

## 2018-03-30 RX ADMIN — Medication 100 MILLIGRAM(S): at 15:49

## 2018-03-30 RX ADMIN — SODIUM CHLORIDE 100 MILLILITER(S): 9 INJECTION, SOLUTION INTRAVENOUS at 11:51

## 2018-03-30 RX ADMIN — OXYCODONE HYDROCHLORIDE 10 MILLIGRAM(S): 5 TABLET ORAL at 21:37

## 2018-03-30 RX ADMIN — OXYCODONE HYDROCHLORIDE 10 MILLIGRAM(S): 5 TABLET ORAL at 17:35

## 2018-03-30 RX ADMIN — Medication 15 MILLIGRAM(S): at 13:40

## 2018-03-30 RX ADMIN — Medication 100 MILLIGRAM(S): at 21:26

## 2018-03-30 RX ADMIN — Medication 325 MILLIGRAM(S): at 17:35

## 2018-03-30 RX ADMIN — OXYCODONE HYDROCHLORIDE 20 MILLIGRAM(S): 5 TABLET ORAL at 06:37

## 2018-03-30 RX ADMIN — OXYCODONE HYDROCHLORIDE 10 MILLIGRAM(S): 5 TABLET ORAL at 20:37

## 2018-03-30 RX ADMIN — HYDROMORPHONE HYDROCHLORIDE 0.5 MILLIGRAM(S): 2 INJECTION INTRAMUSCULAR; INTRAVENOUS; SUBCUTANEOUS at 12:35

## 2018-03-30 RX ADMIN — Medication 15 MILLIGRAM(S): at 13:55

## 2018-03-30 NOTE — DISCHARGE NOTE ADULT - ADDITIONAL INSTRUCTIONS
No strenuous activity, heavy lifting, driving or returning to work until cleared by MD.  You may shower - dressing is water-resistant, no soaking in bathtubs.  Remove dressing after post op day 5-7, then leave incision open to air. Keep incision clean and dry.  Try to have regular bowel movements, take stool softener or laxative if necessary.  May take Pepcid or Zantac for upset stomach.  May take Aleve or Naproxen instead of Meloxicam.  Swelling may travel all the way down leg to foot, this is normal and will subside in a few weeks.  Call to schedule an appointment with Dr. Torrez for follow up, if you have staples or sutures they will be removed in office.  Contact your doctor if you experience: fever greater than 101.5, chills, chest pain, difficulty breathing, redness or excessive drainage around the incision, other concerns.  Follow up with your primary care provider. You are weight bearing as tolerated on left leg with assistive device.  No strenuous activity, heavy lifting, driving or returning to work until cleared by MD.  You have a RONY incisional drain to help promote incisional healing. Keep on until the follow up appointment. When the battery griffith down, hold the orange button to turn off the device. The RONY cannot wet so sponge bathe only until your follow up appointment with Dr. Torrez. Keep incision clean and dry.  Try to have regular bowel movements, take stool softener or laxative if necessary.  May take Pepcid or Zantac for upset stomach.  May take Aleve or Naproxen instead of Meloxicam.  Swelling may travel all the way down leg to foot, this is normal and will subside in a few weeks.  Call to schedule an appointment with Dr. Torrez for follow up, if you have staples or sutures they will be removed in office.  Contact your doctor if you experience: fever greater than 101.5, chills, chest pain, difficulty breathing, redness or excessive drainage around the incision, other concerns.  Follow up with your primary care provider.

## 2018-03-30 NOTE — DISCHARGE NOTE ADULT - CARE PLAN
Principal Discharge DX:	Primary osteoarthritis of left knee  Goal:	Improvement after surgery.  Assessment and plan of treatment:	See below. Principal Discharge DX:	Primary osteoarthritis of left knee  Goal:	Improvement with ambulation and decreased pain after surgery  Assessment and plan of treatment:	See below

## 2018-03-30 NOTE — PHYSICAL THERAPY INITIAL EVALUATION ADULT - PLANNED THERAPY INTERVENTIONS, PT EVAL
transfer training/ROM/gait training/postural re-education/balance training/bed mobility training/strengthening

## 2018-03-30 NOTE — PHYSICAL THERAPY INITIAL EVALUATION ADULT - CRITERIA FOR SKILLED THERAPEUTIC INTERVENTIONS
risk reduction/prevention/rehab potential/anticipated discharge recommendation/predicted duration of therapy intervention/functional limitations in following categories/impairments found/therapy frequency

## 2018-03-30 NOTE — PHYSICAL THERAPY INITIAL EVALUATION ADULT - RANGE OF MOTION EXAMINATION, REHAB EVAL
Right LE ROM was WNL (within normal limits)/no ROM deficits were identified/bilateral upper extremity ROM was WNL (within normal limits)/left ROM 60 degrees no ROM deficits were identified/bilateral upper extremity ROM was WNL (within normal limits)/Right LE ROM was WNL (within normal limits)/left knee ROM 60 degrees

## 2018-03-30 NOTE — PROGRESS NOTE ADULT - SUBJECTIVE AND OBJECTIVE BOX
Orthopedics Post Op Check    Procedure: LEFT TKR	  Surgeon: JIHAN Archer. stable, c/o pain in right knee. Denies CP, SOB, N/V, numbness/tingling  in b/l les    Vital Signs Last 24 Hrs  T(C): 36.6 (30 Mar 2018 10:14), Max: 36.6 (30 Mar 2018 06:14)  T(F): 97.8 (30 Mar 2018 10:14), Max: 97.8 (30 Mar 2018 06:14)  HR: 96 (30 Mar 2018 12:43) (82 - 96)  BP: 169/77 (30 Mar 2018 12:43) (123/85 - 170/79)  BP(mean): 129 (30 Mar 2018 12:18) (90 - 129)  RR: 21 (30 Mar 2018 12:43) (15 - 38)  SpO2: 97% (30 Mar 2018 12:43) (92% - 97%)      Dressing C/D/I WITH CRYOCUFF/ RONY DRESSING     Pulses: DP/PT 2+ B/L LES  SLT:  INTACT B/L LES  Motor:  EHL/FHL/TA/GS  5/5 B/L LES     Post op XR: PROSTHESIS IN PLACE     A/P: 62yoFemale POD#0 s/p LEFT TKR   - Stable  - Pain Control  - DVT ppx:ASA  - Post op abx:ANCEF  - PT, WBS: WBAT B/L LES  - F/U AM Labs

## 2018-03-30 NOTE — DISCHARGE NOTE ADULT - NS AS ACTIVITY OBS
Do not make important decisions/sponge bathe only/Do not drive or operate machinery/No Heavy lifting/straining

## 2018-03-30 NOTE — PHYSICAL THERAPY INITIAL EVALUATION ADULT - PERTINENT HX OF CURRENT PROBLEM, REHAB EVAL
62 y.o F with left knee pain worsened over time without improvement. Denies numbness, tingling. Now s/p elective Left TKR.

## 2018-03-30 NOTE — BRIEF OPERATIVE NOTE - PROCEDURE
<<-----Click on this checkbox to enter Procedure Left total knee arthroplasty  03/30/2018    Active  MIGUEL A

## 2018-03-30 NOTE — DISCHARGE NOTE ADULT - HOSPITAL COURSE
Admitted  Surgery L TKA  Emily-op Antibiotics  Pain control  DVT prophylaxis  OOB/Physical Therapy Admitted  Surgery L TKA 3/30/18  Emily-op Antibiotics  Pain control  DVT prophylaxis  OOB/Physical Therapy Admitted 3/30/18  Surgery L TKA 3/30/18  Emily-op Antibiotics  Pain control  DVT prophylaxis  OOB/Physical Therapy

## 2018-03-30 NOTE — DISCHARGE NOTE ADULT - CARE PROVIDER_API CALL
Apolinar Torrez), Orthopedics  [address] [place] [id number]  Phone: [phone]  Fax: [phone]

## 2018-03-30 NOTE — DISCHARGE NOTE ADULT - MEDICATION SUMMARY - MEDICATIONS TO STOP TAKING
I will STOP taking the medications listed below when I get home from the hospital:    potassium chloride 10 mEq oral tablet, extended release  --  by mouth once a day    amoxicillin 500 mg oral capsule  -- 1 cap(s) by mouth 3 times a day

## 2018-03-30 NOTE — PHYSICAL THERAPY INITIAL EVALUATION ADULT - IMPAIRMENTS FOUND, PT EVAL
ROM/gait, locomotion, and balance/ergonomics and body mechanics/aerobic capacity/endurance/muscle strength/posture

## 2018-03-30 NOTE — DISCHARGE NOTE ADULT - PATIENT PORTAL LINK FT
You can access the [facility] Patient Portal, offered by [facility], by registering with the following website: [email or web address]

## 2018-03-30 NOTE — PHYSICAL THERAPY INITIAL EVALUATION ADULT - ADDITIONAL COMMENTS
Patient lives with he son  in the first floor walk-up building. Needs to negotiate total of 24 steps. Patient was modified independent, ambulated with cane before surgery. Patient lives with her son in first floor walk-up building. Needs to negotiate total of 24 steps. Patient was modified independent, ambulated with cane before surgery.

## 2018-03-30 NOTE — DISCHARGE NOTE ADULT - PLAN OF CARE
Improvement after surgery. See below. Improvement with ambulation and decreased pain after surgery See below

## 2018-03-30 NOTE — DISCHARGE NOTE ADULT - MEDICATION SUMMARY - MEDICATIONS TO TAKE
I will START or STAY ON the medications listed below when I get home from the hospital:    oxyCODONE-acetaminophen 5 mg-325 mg oral tablet  -- 1- 2 tab(s) by mouth every 4 hours as needed for moderate to severe pain MDD:6  -- Caution federal law prohibits the transfer of this drug to any person other  than the person for whom it was prescribed.  May cause drowsiness.  Alcohol may intensify this effect.  Use care when operating dangerous machinery.  This prescription cannot be refilled.  This product contains acetaminophen.  Do not use  with any other product containing acetaminophen to prevent possible liver damage.  Using more of this medication than prescribed may cause serious breathing problems.    -- Indication: For Moderate to severe pain    Ecotrin 325 mg oral delayed release tablet  -- 1 tab(s) by mouth 2 times a day   -- Swallow whole.  Do not crush.  Take with food or milk.    -- Indication: For Prevention of blood clots     amLODIPine 10 mg oral tablet  -- 1 tab(s) by mouth once a day  -- Indication: For Home med    hydroCHLOROthiazide 25 mg oral tablet  -- 1 tab(s) by mouth once a day  -- Indication: For Home med    senna oral tablet  -- 2 tab(s) by mouth once a day (at bedtime), As needed, Constipation  -- Indication: For constipation    polyethylene glycol 3350 oral powder for reconstitution  -- 17 gram(s) by mouth once a day  -- Indication: For constipation    docusate sodium 100 mg oral capsule  -- 1 cap(s) by mouth 3 times a day  -- Indication: For constipation    bisacodyl 10 mg rectal suppository  -- 1 suppository(ies) rectally once a day, As needed, If no bowel movement by postoperative day #2  -- Indication: For constipation

## 2018-03-31 LAB
ANION GAP SERPL CALC-SCNC: 11 MMOL/L — SIGNIFICANT CHANGE UP (ref 5–17)
BUN SERPL-MCNC: 10 MG/DL — SIGNIFICANT CHANGE UP (ref 7–23)
CALCIUM SERPL-MCNC: 8.6 MG/DL — SIGNIFICANT CHANGE UP (ref 8.4–10.5)
CHLORIDE SERPL-SCNC: 100 MMOL/L — SIGNIFICANT CHANGE UP (ref 96–108)
CO2 SERPL-SCNC: 26 MMOL/L — SIGNIFICANT CHANGE UP (ref 22–31)
CREAT SERPL-MCNC: 0.7 MG/DL — SIGNIFICANT CHANGE UP (ref 0.5–1.3)
GLUCOSE SERPL-MCNC: 154 MG/DL — HIGH (ref 70–99)
HCT VFR BLD CALC: 35.7 % — SIGNIFICANT CHANGE UP (ref 34.5–45)
HGB BLD-MCNC: 11.4 G/DL — LOW (ref 11.5–15.5)
MCHC RBC-ENTMCNC: 29.2 PG — SIGNIFICANT CHANGE UP (ref 27–34)
MCHC RBC-ENTMCNC: 31.9 G/DL — LOW (ref 32–36)
MCV RBC AUTO: 91.3 FL — SIGNIFICANT CHANGE UP (ref 80–100)
PLATELET # BLD AUTO: 266 K/UL — SIGNIFICANT CHANGE UP (ref 150–400)
POTASSIUM SERPL-MCNC: 3.5 MMOL/L — SIGNIFICANT CHANGE UP (ref 3.5–5.3)
POTASSIUM SERPL-SCNC: 3.5 MMOL/L — SIGNIFICANT CHANGE UP (ref 3.5–5.3)
RBC # BLD: 3.91 M/UL — SIGNIFICANT CHANGE UP (ref 3.8–5.2)
RBC # FLD: 14.3 % — SIGNIFICANT CHANGE UP (ref 10.3–16.9)
SODIUM SERPL-SCNC: 137 MMOL/L — SIGNIFICANT CHANGE UP (ref 135–145)
WBC # BLD: 8.5 K/UL — SIGNIFICANT CHANGE UP (ref 3.8–10.5)
WBC # FLD AUTO: 8.5 K/UL — SIGNIFICANT CHANGE UP (ref 3.8–10.5)

## 2018-03-31 RX ORDER — POTASSIUM CHLORIDE 20 MEQ
40 PACKET (EA) ORAL
Qty: 0 | Refills: 0 | Status: COMPLETED | OUTPATIENT
Start: 2018-03-31 | End: 2018-03-31

## 2018-03-31 RX ADMIN — OXYCODONE HYDROCHLORIDE 10 MILLIGRAM(S): 5 TABLET ORAL at 14:30

## 2018-03-31 RX ADMIN — Medication 40 MILLIEQUIVALENT(S): at 14:31

## 2018-03-31 RX ADMIN — OXYCODONE HYDROCHLORIDE 10 MILLIGRAM(S): 5 TABLET ORAL at 00:31

## 2018-03-31 RX ADMIN — Medication 100 MILLIGRAM(S): at 14:30

## 2018-03-31 RX ADMIN — OXYCODONE HYDROCHLORIDE 10 MILLIGRAM(S): 5 TABLET ORAL at 15:45

## 2018-03-31 RX ADMIN — OXYCODONE HYDROCHLORIDE 10 MILLIGRAM(S): 5 TABLET ORAL at 05:05

## 2018-03-31 RX ADMIN — Medication 325 MILLIGRAM(S): at 17:12

## 2018-03-31 RX ADMIN — Medication 15 MILLIGRAM(S): at 05:20

## 2018-03-31 RX ADMIN — OXYCODONE HYDROCHLORIDE 10 MILLIGRAM(S): 5 TABLET ORAL at 06:05

## 2018-03-31 RX ADMIN — OXYCODONE HYDROCHLORIDE 10 MILLIGRAM(S): 5 TABLET ORAL at 22:34

## 2018-03-31 RX ADMIN — PANTOPRAZOLE SODIUM 40 MILLIGRAM(S): 20 TABLET, DELAYED RELEASE ORAL at 14:30

## 2018-03-31 RX ADMIN — Medication 100 MILLIGRAM(S): at 05:05

## 2018-03-31 RX ADMIN — OXYCODONE HYDROCHLORIDE 10 MILLIGRAM(S): 5 TABLET ORAL at 06:12

## 2018-03-31 RX ADMIN — OXYCODONE HYDROCHLORIDE 10 MILLIGRAM(S): 5 TABLET ORAL at 19:33

## 2018-03-31 RX ADMIN — OXYCODONE HYDROCHLORIDE 10 MILLIGRAM(S): 5 TABLET ORAL at 17:12

## 2018-03-31 RX ADMIN — OXYCODONE HYDROCHLORIDE 10 MILLIGRAM(S): 5 TABLET ORAL at 10:32

## 2018-03-31 RX ADMIN — OXYCODONE HYDROCHLORIDE 10 MILLIGRAM(S): 5 TABLET ORAL at 23:34

## 2018-03-31 RX ADMIN — Medication 325 MILLIGRAM(S): at 05:05

## 2018-03-31 RX ADMIN — OXYCODONE HYDROCHLORIDE 10 MILLIGRAM(S): 5 TABLET ORAL at 18:12

## 2018-03-31 RX ADMIN — OXYCODONE HYDROCHLORIDE 10 MILLIGRAM(S): 5 TABLET ORAL at 11:30

## 2018-03-31 RX ADMIN — Medication 40 MILLIEQUIVALENT(S): at 08:49

## 2018-03-31 RX ADMIN — OXYCODONE HYDROCHLORIDE 10 MILLIGRAM(S): 5 TABLET ORAL at 18:33

## 2018-03-31 RX ADMIN — Medication 100 MILLIGRAM(S): at 22:34

## 2018-03-31 RX ADMIN — OXYCODONE HYDROCHLORIDE 10 MILLIGRAM(S): 5 TABLET ORAL at 07:12

## 2018-03-31 RX ADMIN — Medication 15 MILLIGRAM(S): at 05:05

## 2018-03-31 RX ADMIN — OXYCODONE HYDROCHLORIDE 10 MILLIGRAM(S): 5 TABLET ORAL at 01:31

## 2018-03-31 RX ADMIN — POLYETHYLENE GLYCOL 3350 17 GRAM(S): 17 POWDER, FOR SOLUTION ORAL at 14:30

## 2018-03-31 NOTE — PROGRESS NOTE ADULT - SUBJECTIVE AND OBJECTIVE BOX
S: Patient seen and examined. Pt. doing well, Pain endorsed but controlled. No acute events overnight.    Vital Signs Last 24 Hrs  T(C): 37 (31 Mar 2018 05:23), Max: 37 (31 Mar 2018 05:23)  T(F): 98.6 (31 Mar 2018 05:23), Max: 98.6 (31 Mar 2018 05:23)  HR: 90 (31 Mar 2018 05:23) (82 - 98)  BP: 152/77 (31 Mar 2018 05:23) (123/85 - 170/79)  BP(mean): 129 (30 Mar 2018 12:18) (90 - 129)  RR: 17 (31 Mar 2018 05:23) (15 - 38)  SpO2: 100% (31 Mar 2018 05:23) (92% - 100%)    NAD, AOx3, comfortable  Motor: 5/5 GS/TA/EHL/FHL    Sensation: SILT   Pulses: WWP, DP/PT 2+    Dressing: C/D/I          A/P:  62y Female s/p L TKA on 3/30         -Stable  -Pain Control  -PT/WBAT  -DVT ppx: ASA  -Advance diet as tolerated  -f/u AM labs  -Dispo: Home

## 2018-04-01 LAB
ANION GAP SERPL CALC-SCNC: 10 MMOL/L — SIGNIFICANT CHANGE UP (ref 5–17)
BUN SERPL-MCNC: 12 MG/DL — SIGNIFICANT CHANGE UP (ref 7–23)
CALCIUM SERPL-MCNC: 8.8 MG/DL — SIGNIFICANT CHANGE UP (ref 8.4–10.5)
CHLORIDE SERPL-SCNC: 98 MMOL/L — SIGNIFICANT CHANGE UP (ref 96–108)
CO2 SERPL-SCNC: 30 MMOL/L — SIGNIFICANT CHANGE UP (ref 22–31)
CREAT SERPL-MCNC: 0.89 MG/DL — SIGNIFICANT CHANGE UP (ref 0.5–1.3)
GLUCOSE SERPL-MCNC: 147 MG/DL — HIGH (ref 70–99)
HCT VFR BLD CALC: 34.2 % — LOW (ref 34.5–45)
HGB BLD-MCNC: 10.7 G/DL — LOW (ref 11.5–15.5)
MCHC RBC-ENTMCNC: 28.4 PG — SIGNIFICANT CHANGE UP (ref 27–34)
MCHC RBC-ENTMCNC: 31.3 G/DL — LOW (ref 32–36)
MCV RBC AUTO: 90.7 FL — SIGNIFICANT CHANGE UP (ref 80–100)
PLATELET # BLD AUTO: 257 K/UL — SIGNIFICANT CHANGE UP (ref 150–400)
POTASSIUM SERPL-MCNC: 4.2 MMOL/L — SIGNIFICANT CHANGE UP (ref 3.5–5.3)
POTASSIUM SERPL-SCNC: 4.2 MMOL/L — SIGNIFICANT CHANGE UP (ref 3.5–5.3)
RBC # BLD: 3.77 M/UL — LOW (ref 3.8–5.2)
RBC # FLD: 14 % — SIGNIFICANT CHANGE UP (ref 10.3–16.9)
SODIUM SERPL-SCNC: 138 MMOL/L — SIGNIFICANT CHANGE UP (ref 135–145)
WBC # BLD: 8.6 K/UL — SIGNIFICANT CHANGE UP (ref 3.8–10.5)
WBC # FLD AUTO: 8.6 K/UL — SIGNIFICANT CHANGE UP (ref 3.8–10.5)

## 2018-04-01 RX ORDER — POTASSIUM CHLORIDE 20 MEQ
10 PACKET (EA) ORAL DAILY
Qty: 0 | Refills: 0 | Status: DISCONTINUED | OUTPATIENT
Start: 2018-04-01 | End: 2018-04-02

## 2018-04-01 RX ORDER — AMLODIPINE BESYLATE 2.5 MG/1
10 TABLET ORAL DAILY
Qty: 0 | Refills: 0 | Status: DISCONTINUED | OUTPATIENT
Start: 2018-04-01 | End: 2018-04-02

## 2018-04-01 RX ORDER — HYDROCHLOROTHIAZIDE 25 MG
25 TABLET ORAL DAILY
Qty: 0 | Refills: 0 | Status: DISCONTINUED | OUTPATIENT
Start: 2018-04-01 | End: 2018-04-02

## 2018-04-01 RX ADMIN — OXYCODONE HYDROCHLORIDE 10 MILLIGRAM(S): 5 TABLET ORAL at 03:53

## 2018-04-01 RX ADMIN — OXYCODONE HYDROCHLORIDE 10 MILLIGRAM(S): 5 TABLET ORAL at 13:52

## 2018-04-01 RX ADMIN — Medication 10 MILLIEQUIVALENT(S): at 13:14

## 2018-04-01 RX ADMIN — OXYCODONE HYDROCHLORIDE 10 MILLIGRAM(S): 5 TABLET ORAL at 02:53

## 2018-04-01 RX ADMIN — OXYCODONE HYDROCHLORIDE 10 MILLIGRAM(S): 5 TABLET ORAL at 08:16

## 2018-04-01 RX ADMIN — OXYCODONE HYDROCHLORIDE 10 MILLIGRAM(S): 5 TABLET ORAL at 17:00

## 2018-04-01 RX ADMIN — PANTOPRAZOLE SODIUM 40 MILLIGRAM(S): 20 TABLET, DELAYED RELEASE ORAL at 13:14

## 2018-04-01 RX ADMIN — OXYCODONE HYDROCHLORIDE 10 MILLIGRAM(S): 5 TABLET ORAL at 12:13

## 2018-04-01 RX ADMIN — Medication 100 MILLIGRAM(S): at 05:30

## 2018-04-01 RX ADMIN — Medication 100 MILLIGRAM(S): at 13:14

## 2018-04-01 RX ADMIN — OXYCODONE HYDROCHLORIDE 10 MILLIGRAM(S): 5 TABLET ORAL at 16:11

## 2018-04-01 RX ADMIN — Medication 325 MILLIGRAM(S): at 05:30

## 2018-04-01 RX ADMIN — OXYCODONE HYDROCHLORIDE 10 MILLIGRAM(S): 5 TABLET ORAL at 05:29

## 2018-04-01 RX ADMIN — OXYCODONE HYDROCHLORIDE 10 MILLIGRAM(S): 5 TABLET ORAL at 17:47

## 2018-04-01 RX ADMIN — POLYETHYLENE GLYCOL 3350 17 GRAM(S): 17 POWDER, FOR SOLUTION ORAL at 13:14

## 2018-04-01 RX ADMIN — OXYCODONE HYDROCHLORIDE 10 MILLIGRAM(S): 5 TABLET ORAL at 16:28

## 2018-04-01 RX ADMIN — Medication 100 MILLIGRAM(S): at 21:05

## 2018-04-01 RX ADMIN — OXYCODONE HYDROCHLORIDE 10 MILLIGRAM(S): 5 TABLET ORAL at 22:02

## 2018-04-01 RX ADMIN — Medication 325 MILLIGRAM(S): at 17:00

## 2018-04-01 RX ADMIN — OXYCODONE HYDROCHLORIDE 10 MILLIGRAM(S): 5 TABLET ORAL at 06:29

## 2018-04-01 RX ADMIN — OXYCODONE HYDROCHLORIDE 10 MILLIGRAM(S): 5 TABLET ORAL at 21:06

## 2018-04-01 RX ADMIN — AMLODIPINE BESYLATE 10 MILLIGRAM(S): 2.5 TABLET ORAL at 06:25

## 2018-04-01 RX ADMIN — OXYCODONE HYDROCHLORIDE 10 MILLIGRAM(S): 5 TABLET ORAL at 09:16

## 2018-04-01 RX ADMIN — Medication 25 MILLIGRAM(S): at 06:25

## 2018-04-01 NOTE — PROGRESS NOTE ADULT - SUBJECTIVE AND OBJECTIVE BOX
S: Patient seen and examined. Pt. doing well, Pain endorsed but controlled. No acute events overnight.    Vital Signs Last 24 Hrs  T(C): 37.2 (01 Apr 2018 05:27), Max: 37.4 (31 Mar 2018 16:15)  T(F): 98.9 (01 Apr 2018 05:27), Max: 99.3 (31 Mar 2018 16:15)  HR: 99 (01 Apr 2018 05:27) (85 - 104)  BP: 160/88 (01 Apr 2018 05:27) (151/77 - 165/80)  BP(mean): --  RR: 16 (01 Apr 2018 05:27) (16 - 17)  SpO2: 95% (01 Apr 2018 05:27) (95% - 96%)    NAD, AOx3, comfortable  Motor: 5/5 GS/TA/EHL/FHL    Sensation: SILT   Pulses: WWP, DP/PT 2+    Dressing: C/D/I                          11.4   8.5   )-----------( 266      ( 31 Mar 2018 11:38 )             35.7         A/P:  62y Female s/p L TKA on 3/30         -Stable  -Pain Control  -PT/WBAT  -DVT ppx: ASA  -Advance diet as tolerated  -f/u AM labs  -Dispo: Home

## 2018-04-02 VITALS
TEMPERATURE: 99 F | SYSTOLIC BLOOD PRESSURE: 134 MMHG | RESPIRATION RATE: 16 BRPM | HEART RATE: 87 BPM | OXYGEN SATURATION: 96 % | DIASTOLIC BLOOD PRESSURE: 77 MMHG

## 2018-04-02 LAB — SURGICAL PATHOLOGY STUDY: SIGNIFICANT CHANGE UP

## 2018-04-02 RX ORDER — INFLUENZA VIRUS VACCINE 15; 15; 15; 15 UG/.5ML; UG/.5ML; UG/.5ML; UG/.5ML
0.5 SUSPENSION INTRAMUSCULAR ONCE
Qty: 0 | Refills: 0 | Status: COMPLETED | OUTPATIENT
Start: 2018-04-02 | End: 2018-04-02

## 2018-04-02 RX ORDER — OXYCODONE HYDROCHLORIDE 5 MG/1
1 TABLET ORAL
Qty: 0 | Refills: 0 | COMMUNITY

## 2018-04-02 RX ORDER — SENNA PLUS 8.6 MG/1
2 TABLET ORAL
Qty: 0 | Refills: 0 | COMMUNITY
Start: 2018-04-02

## 2018-04-02 RX ORDER — ASPIRIN/CALCIUM CARB/MAGNESIUM 324 MG
1 TABLET ORAL
Qty: 60 | Refills: 0 | OUTPATIENT
Start: 2018-04-02 | End: 2018-05-01

## 2018-04-02 RX ORDER — DOCUSATE SODIUM 100 MG
1 CAPSULE ORAL
Qty: 0 | Refills: 0 | COMMUNITY
Start: 2018-04-02

## 2018-04-02 RX ORDER — POLYETHYLENE GLYCOL 3350 17 G/17G
17 POWDER, FOR SOLUTION ORAL
Qty: 0 | Refills: 0 | COMMUNITY
Start: 2018-04-02

## 2018-04-02 RX ORDER — AMOXICILLIN 250 MG/5ML
1 SUSPENSION, RECONSTITUTED, ORAL (ML) ORAL
Qty: 0 | Refills: 0 | COMMUNITY

## 2018-04-02 RX ORDER — POTASSIUM CHLORIDE 20 MEQ
0 PACKET (EA) ORAL
Qty: 0 | Refills: 0 | COMMUNITY

## 2018-04-02 RX ADMIN — OXYCODONE HYDROCHLORIDE 10 MILLIGRAM(S): 5 TABLET ORAL at 05:43

## 2018-04-02 RX ADMIN — OXYCODONE HYDROCHLORIDE 10 MILLIGRAM(S): 5 TABLET ORAL at 03:45

## 2018-04-02 RX ADMIN — PANTOPRAZOLE SODIUM 40 MILLIGRAM(S): 20 TABLET, DELAYED RELEASE ORAL at 12:12

## 2018-04-02 RX ADMIN — OXYCODONE HYDROCHLORIDE 10 MILLIGRAM(S): 5 TABLET ORAL at 09:02

## 2018-04-02 RX ADMIN — Medication 25 MILLIGRAM(S): at 05:43

## 2018-04-02 RX ADMIN — OXYCODONE HYDROCHLORIDE 10 MILLIGRAM(S): 5 TABLET ORAL at 08:16

## 2018-04-02 RX ADMIN — OXYCODONE HYDROCHLORIDE 10 MILLIGRAM(S): 5 TABLET ORAL at 02:49

## 2018-04-02 RX ADMIN — INFLUENZA VIRUS VACCINE 0.5 MILLILITER(S): 15; 15; 15; 15 SUSPENSION INTRAMUSCULAR at 12:31

## 2018-04-02 RX ADMIN — AMLODIPINE BESYLATE 10 MILLIGRAM(S): 2.5 TABLET ORAL at 05:43

## 2018-04-02 RX ADMIN — Medication 100 MILLIGRAM(S): at 05:44

## 2018-04-02 RX ADMIN — OXYCODONE HYDROCHLORIDE 10 MILLIGRAM(S): 5 TABLET ORAL at 12:12

## 2018-04-02 RX ADMIN — Medication 325 MILLIGRAM(S): at 05:43

## 2018-04-02 NOTE — PROGRESS NOTE ADULT - SUBJECTIVE AND OBJECTIVE BOX
S: Patient seen and examined. Pt. doing well, Pain endorsed but controlled. No acute events overnight.    Vital Signs Last 24 Hrs  T(C): 37.1 (02 Apr 2018 05:41), Max: 37.5 (02 Apr 2018 02:48)  T(F): 98.8 (02 Apr 2018 05:41), Max: 99.5 (02 Apr 2018 02:48)  HR: 88 (02 Apr 2018 05:41) (88 - 100)  BP: 137/77 (02 Apr 2018 05:41) (127/78 - 155/75)  BP(mean): --  RR: 16 (02 Apr 2018 05:41) (16 - 18)  SpO2: 95% (02 Apr 2018 05:41) (95% - 97%)    NAD, AOx3, comfortable  Motor: 5/5 GS/TA/EHL/FHL    Sensation: SILT   Pulses: WWP, DP/PT 2+    Dressing: C/D/I                          10.7   8.6   )-----------( 257      ( 01 Apr 2018 06:36 )             34.2           A/P:  62y Female s/p L TKA on 3/30         -Stable  -Pain Control  -PT/WBAT  -DVT ppx: ASA  -Advance diet as tolerated  -f/u AM labs  -Dispo: Home

## 2018-04-03 PROCEDURE — 36415 COLL VENOUS BLD VENIPUNCTURE: CPT

## 2018-04-03 PROCEDURE — 73560 X-RAY EXAM OF KNEE 1 OR 2: CPT

## 2018-04-03 PROCEDURE — C1713: CPT

## 2018-04-03 PROCEDURE — 80048 BASIC METABOLIC PNL TOTAL CA: CPT

## 2018-04-03 PROCEDURE — 90686 IIV4 VACC NO PRSV 0.5 ML IM: CPT

## 2018-04-03 PROCEDURE — 97530 THERAPEUTIC ACTIVITIES: CPT

## 2018-04-03 PROCEDURE — 88300 SURGICAL PATH GROSS: CPT

## 2018-04-03 PROCEDURE — 85027 COMPLETE CBC AUTOMATED: CPT

## 2018-04-03 PROCEDURE — C1776: CPT

## 2018-04-03 PROCEDURE — 97116 GAIT TRAINING THERAPY: CPT

## 2018-04-03 PROCEDURE — 97161 PT EVAL LOW COMPLEX 20 MIN: CPT

## 2018-04-06 DIAGNOSIS — F41.9 ANXIETY DISORDER, UNSPECIFIED: ICD-10-CM

## 2018-04-06 DIAGNOSIS — I10 ESSENTIAL (PRIMARY) HYPERTENSION: ICD-10-CM

## 2018-04-06 DIAGNOSIS — Z91.018 ALLERGY TO OTHER FOODS: ICD-10-CM

## 2018-04-06 DIAGNOSIS — Z79.891 LONG TERM (CURRENT) USE OF OPIATE ANALGESIC: ICD-10-CM

## 2018-04-06 DIAGNOSIS — Z88.8 ALLERGY STATUS TO OTHER DRUGS, MEDICAMENTS AND BIOLOGICAL SUBSTANCES: ICD-10-CM

## 2018-04-06 DIAGNOSIS — F32.9 MAJOR DEPRESSIVE DISORDER, SINGLE EPISODE, UNSPECIFIED: ICD-10-CM

## 2018-04-06 DIAGNOSIS — E55.9 VITAMIN D DEFICIENCY, UNSPECIFIED: ICD-10-CM

## 2018-04-06 DIAGNOSIS — E66.9 OBESITY, UNSPECIFIED: ICD-10-CM

## 2018-04-06 DIAGNOSIS — Z87.891 PERSONAL HISTORY OF NICOTINE DEPENDENCE: ICD-10-CM

## 2018-04-06 DIAGNOSIS — M17.12 UNILATERAL PRIMARY OSTEOARTHRITIS, LEFT KNEE: ICD-10-CM

## 2018-04-06 DIAGNOSIS — Z79.82 LONG TERM (CURRENT) USE OF ASPIRIN: ICD-10-CM

## 2018-04-06 DIAGNOSIS — Z98.890 OTHER SPECIFIED POSTPROCEDURAL STATES: ICD-10-CM

## 2018-04-06 DIAGNOSIS — Z96.651 PRESENCE OF RIGHT ARTIFICIAL KNEE JOINT: ICD-10-CM

## 2018-04-06 DIAGNOSIS — Z81.8 FAMILY HISTORY OF OTHER MENTAL AND BEHAVIORAL DISORDERS: ICD-10-CM

## 2018-06-05 ENCOUNTER — EMERGENCY (EMERGENCY)
Facility: HOSPITAL | Age: 63
LOS: 1 days | Discharge: HOME | End: 2018-06-05
Attending: EMERGENCY MEDICINE | Admitting: EMERGENCY MEDICINE
Payer: MEDICARE

## 2018-06-05 VITALS
DIASTOLIC BLOOD PRESSURE: 83 MMHG | TEMPERATURE: 98 F | SYSTOLIC BLOOD PRESSURE: 141 MMHG | RESPIRATION RATE: 16 BRPM | OXYGEN SATURATION: 99 % | HEART RATE: 92 BPM

## 2018-06-05 VITALS
OXYGEN SATURATION: 98 % | TEMPERATURE: 98 F | DIASTOLIC BLOOD PRESSURE: 68 MMHG | RESPIRATION RATE: 18 BRPM | WEIGHT: 255.07 LBS | HEART RATE: 110 BPM | SYSTOLIC BLOOD PRESSURE: 156 MMHG

## 2018-06-05 DIAGNOSIS — Z98.890 OTHER SPECIFIED POSTPROCEDURAL STATES: Chronic | ICD-10-CM

## 2018-06-05 DIAGNOSIS — Z79.891 LONG TERM (CURRENT) USE OF OPIATE ANALGESIC: ICD-10-CM

## 2018-06-05 DIAGNOSIS — Z88.8 ALLERGY STATUS TO OTHER DRUGS, MEDICAMENTS AND BIOLOGICAL SUBSTANCES: ICD-10-CM

## 2018-06-05 DIAGNOSIS — K92.2 GASTROINTESTINAL HEMORRHAGE, UNSPECIFIED: ICD-10-CM

## 2018-06-05 DIAGNOSIS — Z79.899 OTHER LONG TERM (CURRENT) DRUG THERAPY: ICD-10-CM

## 2018-06-05 DIAGNOSIS — I10 ESSENTIAL (PRIMARY) HYPERTENSION: ICD-10-CM

## 2018-06-05 DIAGNOSIS — Z96.651 PRESENCE OF RIGHT ARTIFICIAL KNEE JOINT: Chronic | ICD-10-CM

## 2018-06-05 DIAGNOSIS — Z91.018 ALLERGY TO OTHER FOODS: ICD-10-CM

## 2018-06-05 DIAGNOSIS — K62.5 HEMORRHAGE OF ANUS AND RECTUM: ICD-10-CM

## 2018-06-05 DIAGNOSIS — D25.9 LEIOMYOMA OF UTERUS, UNSPECIFIED: Chronic | ICD-10-CM

## 2018-06-05 PROBLEM — M17.0 BILATERAL PRIMARY OSTEOARTHRITIS OF KNEE: Chronic | Status: ACTIVE | Noted: 2018-03-29

## 2018-06-05 PROBLEM — E66.9 OBESITY, UNSPECIFIED: Chronic | Status: ACTIVE | Noted: 2018-03-29

## 2018-06-05 PROBLEM — E55.9 VITAMIN D DEFICIENCY, UNSPECIFIED: Chronic | Status: ACTIVE | Noted: 2018-03-29

## 2018-06-05 LAB
ALBUMIN SERPL ELPH-MCNC: 3.7 G/DL — SIGNIFICANT CHANGE UP (ref 3.3–5)
ALP SERPL-CCNC: 83 U/L — SIGNIFICANT CHANGE UP (ref 40–120)
ALT FLD-CCNC: 14 U/L — SIGNIFICANT CHANGE UP (ref 10–45)
ANION GAP SERPL CALC-SCNC: 14 MMOL/L — SIGNIFICANT CHANGE UP (ref 5–17)
APTT BLD: 23.5 SEC — LOW (ref 27.5–37.4)
AST SERPL-CCNC: 13 U/L — SIGNIFICANT CHANGE UP (ref 10–40)
BASOPHILS NFR BLD AUTO: 0.1 % — SIGNIFICANT CHANGE UP (ref 0–2)
BILIRUB SERPL-MCNC: 0.2 MG/DL — SIGNIFICANT CHANGE UP (ref 0.2–1.2)
BUN SERPL-MCNC: 19 MG/DL — SIGNIFICANT CHANGE UP (ref 7–23)
CALCIUM SERPL-MCNC: 9.1 MG/DL — SIGNIFICANT CHANGE UP (ref 8.4–10.5)
CHLORIDE SERPL-SCNC: 98 MMOL/L — SIGNIFICANT CHANGE UP (ref 96–108)
CO2 SERPL-SCNC: 24 MMOL/L — SIGNIFICANT CHANGE UP (ref 22–31)
CREAT SERPL-MCNC: 0.89 MG/DL — SIGNIFICANT CHANGE UP (ref 0.5–1.3)
EOSINOPHIL NFR BLD AUTO: 0.6 % — SIGNIFICANT CHANGE UP (ref 0–6)
GLUCOSE SERPL-MCNC: 194 MG/DL — HIGH (ref 70–99)
HCT VFR BLD CALC: 28.4 % — LOW (ref 34.5–45)
HCT VFR BLD CALC: 32 % — LOW (ref 34.5–45)
HGB BLD-MCNC: 10 G/DL — LOW (ref 11.5–15.5)
HGB BLD-MCNC: 9.1 G/DL — LOW (ref 11.5–15.5)
INR BLD: 1.04 — SIGNIFICANT CHANGE UP (ref 0.88–1.16)
LYMPHOCYTES # BLD AUTO: 28.8 % — SIGNIFICANT CHANGE UP (ref 13–44)
MAGNESIUM SERPL-MCNC: 1.9 MG/DL — SIGNIFICANT CHANGE UP (ref 1.6–2.6)
MCHC RBC-ENTMCNC: 27.7 PG — SIGNIFICANT CHANGE UP (ref 27–34)
MCHC RBC-ENTMCNC: 28.3 PG — SIGNIFICANT CHANGE UP (ref 27–34)
MCHC RBC-ENTMCNC: 31.3 G/DL — LOW (ref 32–36)
MCHC RBC-ENTMCNC: 32 G/DL — SIGNIFICANT CHANGE UP (ref 32–36)
MCV RBC AUTO: 88.2 FL — SIGNIFICANT CHANGE UP (ref 80–100)
MCV RBC AUTO: 88.6 FL — SIGNIFICANT CHANGE UP (ref 80–100)
MONOCYTES NFR BLD AUTO: 7.3 % — SIGNIFICANT CHANGE UP (ref 2–14)
NEUTROPHILS NFR BLD AUTO: 63.2 % — SIGNIFICANT CHANGE UP (ref 43–77)
PLATELET # BLD AUTO: 267 K/UL — SIGNIFICANT CHANGE UP (ref 150–400)
PLATELET # BLD AUTO: 285 K/UL — SIGNIFICANT CHANGE UP (ref 150–400)
POTASSIUM SERPL-MCNC: 4 MMOL/L — SIGNIFICANT CHANGE UP (ref 3.5–5.3)
POTASSIUM SERPL-SCNC: 4 MMOL/L — SIGNIFICANT CHANGE UP (ref 3.5–5.3)
PROT SERPL-MCNC: 6.5 G/DL — SIGNIFICANT CHANGE UP (ref 6–8.3)
PROTHROM AB SERPL-ACNC: 11.6 SEC — SIGNIFICANT CHANGE UP (ref 9.8–12.7)
RBC # BLD: 3.22 M/UL — LOW (ref 3.8–5.2)
RBC # BLD: 3.61 M/UL — LOW (ref 3.8–5.2)
RBC # FLD: 13.5 % — SIGNIFICANT CHANGE UP (ref 10.3–16.9)
RBC # FLD: 13.8 % — SIGNIFICANT CHANGE UP (ref 10.3–16.9)
SODIUM SERPL-SCNC: 136 MMOL/L — SIGNIFICANT CHANGE UP (ref 135–145)
WBC # BLD: 8.5 K/UL — SIGNIFICANT CHANGE UP (ref 3.8–10.5)
WBC # BLD: 9 K/UL — SIGNIFICANT CHANGE UP (ref 3.8–10.5)
WBC # FLD AUTO: 8.5 K/UL — SIGNIFICANT CHANGE UP (ref 3.8–10.5)
WBC # FLD AUTO: 9 K/UL — SIGNIFICANT CHANGE UP (ref 3.8–10.5)

## 2018-06-05 PROCEDURE — 83735 ASSAY OF MAGNESIUM: CPT

## 2018-06-05 PROCEDURE — 93010 ELECTROCARDIOGRAM REPORT: CPT

## 2018-06-05 PROCEDURE — 85730 THROMBOPLASTIN TIME PARTIAL: CPT

## 2018-06-05 PROCEDURE — 96374 THER/PROPH/DIAG INJ IV PUSH: CPT

## 2018-06-05 PROCEDURE — 85610 PROTHROMBIN TIME: CPT

## 2018-06-05 PROCEDURE — 74174 CTA ABD&PLVS W/CONTRAST: CPT

## 2018-06-05 PROCEDURE — 93005 ELECTROCARDIOGRAM TRACING: CPT

## 2018-06-05 PROCEDURE — 80053 COMPREHEN METABOLIC PANEL: CPT

## 2018-06-05 PROCEDURE — 36415 COLL VENOUS BLD VENIPUNCTURE: CPT

## 2018-06-05 PROCEDURE — 99284 EMERGENCY DEPT VISIT MOD MDM: CPT | Mod: 25

## 2018-06-05 PROCEDURE — 85025 COMPLETE CBC W/AUTO DIFF WBC: CPT

## 2018-06-05 PROCEDURE — 74174 CTA ABD&PLVS W/CONTRAST: CPT | Mod: 26

## 2018-06-05 PROCEDURE — 85027 COMPLETE CBC AUTOMATED: CPT

## 2018-06-05 RX ORDER — PANTOPRAZOLE SODIUM 20 MG/1
1 TABLET, DELAYED RELEASE ORAL
Qty: 14 | Refills: 0 | OUTPATIENT
Start: 2018-06-05 | End: 2018-06-18

## 2018-06-05 RX ORDER — SODIUM CHLORIDE 9 MG/ML
1000 INJECTION INTRAMUSCULAR; INTRAVENOUS; SUBCUTANEOUS ONCE
Qty: 0 | Refills: 0 | Status: COMPLETED | OUTPATIENT
Start: 2018-06-05 | End: 2018-06-05

## 2018-06-05 RX ORDER — PANTOPRAZOLE SODIUM 20 MG/1
80 TABLET, DELAYED RELEASE ORAL ONCE
Qty: 0 | Refills: 0 | Status: COMPLETED | OUTPATIENT
Start: 2018-06-05 | End: 2018-06-05

## 2018-06-05 RX ORDER — IOHEXOL 300 MG/ML
50 INJECTION, SOLUTION INTRAVENOUS ONCE
Qty: 0 | Refills: 0 | Status: COMPLETED | OUTPATIENT
Start: 2018-06-05 | End: 2018-06-05

## 2018-06-05 RX ADMIN — SODIUM CHLORIDE 2000 MILLILITER(S): 9 INJECTION INTRAMUSCULAR; INTRAVENOUS; SUBCUTANEOUS at 02:22

## 2018-06-05 RX ADMIN — IOHEXOL 50 MILLILITER(S): 300 INJECTION, SOLUTION INTRAVENOUS at 02:22

## 2018-06-05 RX ADMIN — PANTOPRAZOLE SODIUM 80 MILLIGRAM(S): 20 TABLET, DELAYED RELEASE ORAL at 02:21

## 2018-06-05 NOTE — ED PROVIDER NOTE - PLAN OF CARE
Discharge on PPI and close outpatient follow up. If symptoms present themselves again, patient agree to ocome back to ER.

## 2018-06-05 NOTE — ED PROVIDER NOTE - CARE PLAN
Principal Discharge DX:	GI bleed Principal Discharge DX:	GI bleed  Assessment and plan of treatment:	Discharge on PPI and close outpatient follow up. If symptoms present themselves again, patient agree to ocome back to ER.

## 2018-06-05 NOTE — ED ADULT NURSE NOTE - OBJECTIVE STATEMENT
61 y/o female presents to the ED c/o finding blood in her stool. pt reports 3 episodes of bowel movements whereby stool was loose and magenta colored stool and red blood presents. Pt reports finding was the first time. Pt reports abd feels cool. Pt speaks clear, MAEx4, has unlabored breathing. Abd morbid obese soft nt. Skin dry, warm.

## 2018-06-05 NOTE — ED PROVIDER NOTE - ATTENDING CONTRIBUTION TO CARE
episode of blood with bm x3 today.  loose stool/ diarrhea.  denies abd pain.  not on any anticoagulants but taking nsaids for recent knee surgery.  abd soft, non tender.  rectal with scant brb.  no bm in the ED during visit.  ct done showing suspected bleeding hemorrhoid.  suspect bleeding from hemorroid, less likely gastritis from nsaid use.  hgb without significant drop after 1L fluid and observation in ED>  to f/u outpatient, ppi, return if worsens

## 2018-06-05 NOTE — ED PROVIDER NOTE - SKIN, MLM
Skin normal color for race, warm, dry and intact. No evidence of rash. +well healing bilateral knee scars (from arthroplasty)

## 2018-06-05 NOTE — ED PROVIDER NOTE - OBJECTIVE STATEMENT
61 yo female presents with 3 day history of rectal 61 yo female presents with 1-day history of rectal bleeding x3 times. Started this evening at 4:30 pm, 6:00 pm, 10:30 PM and was progressive each time with more blood and maroon colored stool in the most recent episode. This has never happened before. Of note patient recently had left knee arthroplasty at [facility]. She has been off blood thinners (aspirin) for weeks. She was intermittently taking meloxicam, for her pain, most recently on May 30th. No other nsaid use. No hx reflux, stomach ulcers, no hx diverticulosis, bowel movements have been regular; though patient has been taking oxycodone for pain control. Other wise 1x episode nausea since 61 yo female presents with 1-day history of rectal bleeding x3 times. Started this evening at 4:30 pm, 6:00 pm, 10:30 PM and was progressive each time with more blood and maroon colored stool in the most recent episode with associated diarrhea type bowel movement. This has never happened before. Of note patient recently had left knee arthroplasty at [facility]. She has been off blood thinners (aspirin) for weeks. She was intermittently taking meloxicam, for her pain, most recently on May 30th. No other nsaid use. No hx reflux, stomach ulcers, no hx diverticulosis, bowel movements have been regular; though patient has been taking oxycodone for pain control. Other wise 1x episode nausea since came to ER which has since resolved.

## 2018-06-05 NOTE — ED PROVIDER NOTE - RECTAL
+minimal blood on finger ~2-3 inches inside; no external hemorrhoide, no internal hemorrhoids felt/non-tender

## 2018-06-05 NOTE — ED ADULT TRIAGE NOTE - CHIEF COMPLAINT QUOTE
rectal bleeding x 3 started yesterday afternoon. Pt denies abdominal pain . pt had lt knee replacement 3/30/18 and off Asa for few weeks.

## 2018-06-05 NOTE — ED PROVIDER NOTE - MEDICAL DECISION MAKING DETAILS
Gi bleed, given diarrhea with blood, there is some concern for upper gi bleed v. lower. Will get basic labs, give fluids, and CT abd/pelvis with oral/iv contrast.

## 2018-06-11 PROBLEM — Z00.00 ENCOUNTER FOR PREVENTIVE HEALTH EXAMINATION: Status: ACTIVE | Noted: 2018-06-11

## 2018-06-18 ENCOUNTER — APPOINTMENT (OUTPATIENT)
Dept: GASTROENTEROLOGY | Facility: CLINIC | Age: 63
End: 2018-06-18
Payer: MEDICARE

## 2018-06-18 VITALS
RESPIRATION RATE: 16 BRPM | TEMPERATURE: 98.2 F | HEART RATE: 94 BPM | HEIGHT: 64 IN | OXYGEN SATURATION: 96 % | DIASTOLIC BLOOD PRESSURE: 80 MMHG | SYSTOLIC BLOOD PRESSURE: 154 MMHG | BODY MASS INDEX: 42.51 KG/M2 | WEIGHT: 249 LBS

## 2018-06-18 DIAGNOSIS — Z82.3 FAMILY HISTORY OF STROKE: ICD-10-CM

## 2018-06-18 DIAGNOSIS — Z82.49 FAMILY HISTORY OF ISCHEMIC HEART DISEASE AND OTHER DISEASES OF THE CIRCULATORY SYSTEM: ICD-10-CM

## 2018-06-18 DIAGNOSIS — Z86.79 PERSONAL HISTORY OF OTHER DISEASES OF THE CIRCULATORY SYSTEM: ICD-10-CM

## 2018-06-18 DIAGNOSIS — K92.1 MELENA: ICD-10-CM

## 2018-06-18 PROCEDURE — 36415 COLL VENOUS BLD VENIPUNCTURE: CPT

## 2018-06-18 PROCEDURE — 99205 OFFICE O/P NEW HI 60 MIN: CPT | Mod: 25

## 2018-06-18 RX ORDER — GUAIFENESIN 1200 MG/1
TABLET, EXTENDED RELEASE ORAL
Refills: 0 | Status: ACTIVE | COMMUNITY

## 2018-06-18 RX ORDER — AMLODIPINE BESYLATE 5 MG/1
TABLET ORAL
Refills: 0 | Status: ACTIVE | COMMUNITY

## 2018-06-18 RX ORDER — HYDROCHLOROTHIAZIDE 12.5 MG/1
TABLET ORAL
Refills: 0 | Status: ACTIVE | COMMUNITY

## 2018-06-19 LAB
ANION GAP SERPL CALC-SCNC: 15 MMOL/L
BASOPHILS # BLD AUTO: 0.03 K/UL
BASOPHILS NFR BLD AUTO: 0.4 %
BUN SERPL-MCNC: 17 MG/DL
CALCIUM SERPL-MCNC: 9.8 MG/DL
CHLORIDE SERPL-SCNC: 102 MMOL/L
CO2 SERPL-SCNC: 24 MMOL/L
CREAT SERPL-MCNC: 0.91 MG/DL
EOSINOPHIL # BLD AUTO: 0.09 K/UL
EOSINOPHIL NFR BLD AUTO: 1.2 %
GLUCOSE SERPL-MCNC: 113 MG/DL
HCT VFR BLD CALC: 31.1 %
HGB BLD-MCNC: 8.9 G/DL
IMM GRANULOCYTES NFR BLD AUTO: 0.1 %
LYMPHOCYTES # BLD AUTO: 2.11 K/UL
LYMPHOCYTES NFR BLD AUTO: 28.6 %
MAN DIFF?: NORMAL
MCHC RBC-ENTMCNC: 25.8 PG
MCHC RBC-ENTMCNC: 28.6 GM/DL
MCV RBC AUTO: 90.1 FL
MONOCYTES # BLD AUTO: 0.45 K/UL
MONOCYTES NFR BLD AUTO: 6.1 %
NEUTROPHILS # BLD AUTO: 4.7 K/UL
NEUTROPHILS NFR BLD AUTO: 63.6 %
PLATELET # BLD AUTO: 497 K/UL
POTASSIUM SERPL-SCNC: 4.1 MMOL/L
RBC # BLD: 3.45 M/UL
RBC # FLD: 15.1 %
SODIUM SERPL-SCNC: 141 MMOL/L
WBC # FLD AUTO: 7.39 K/UL

## 2018-07-16 ENCOUNTER — OUTPATIENT (OUTPATIENT)
Dept: OUTPATIENT SERVICES | Facility: HOSPITAL | Age: 63
LOS: 1 days | Discharge: HOME | End: 2018-07-16
Payer: MEDICARE

## 2018-07-16 ENCOUNTER — APPOINTMENT (OUTPATIENT)
Dept: GASTROENTEROLOGY | Facility: HOSPITAL | Age: 63
End: 2018-07-16

## 2018-07-16 DIAGNOSIS — Z96.651 PRESENCE OF RIGHT ARTIFICIAL KNEE JOINT: Chronic | ICD-10-CM

## 2018-07-16 DIAGNOSIS — D25.9 LEIOMYOMA OF UTERUS, UNSPECIFIED: Chronic | ICD-10-CM

## 2018-07-16 DIAGNOSIS — Z98.890 OTHER SPECIFIED POSTPROCEDURAL STATES: Chronic | ICD-10-CM

## 2018-07-16 PROCEDURE — 45378 DIAGNOSTIC COLONOSCOPY: CPT

## 2018-07-19 DIAGNOSIS — Z91.018 ALLERGY TO OTHER FOODS: ICD-10-CM

## 2018-07-19 DIAGNOSIS — I10 ESSENTIAL (PRIMARY) HYPERTENSION: ICD-10-CM

## 2018-07-19 DIAGNOSIS — F32.9 MAJOR DEPRESSIVE DISORDER, SINGLE EPISODE, UNSPECIFIED: ICD-10-CM

## 2018-07-19 DIAGNOSIS — E66.9 OBESITY, UNSPECIFIED: ICD-10-CM

## 2018-07-19 DIAGNOSIS — Z88.8 ALLERGY STATUS TO OTHER DRUGS, MEDICAMENTS AND BIOLOGICAL SUBSTANCES: ICD-10-CM

## 2018-07-19 DIAGNOSIS — K64.8 OTHER HEMORRHOIDS: ICD-10-CM

## 2018-07-19 DIAGNOSIS — K57.30 DIVERTICULOSIS OF LARGE INTESTINE WITHOUT PERFORATION OR ABSCESS WITHOUT BLEEDING: ICD-10-CM

## 2018-07-19 DIAGNOSIS — K92.1 MELENA: ICD-10-CM

## 2018-11-26 ENCOUNTER — APPOINTMENT (OUTPATIENT)
Dept: NEUROSURGERY | Facility: CLINIC | Age: 63
End: 2018-11-26
Payer: MEDICARE

## 2018-11-26 VITALS
HEIGHT: 64 IN | SYSTOLIC BLOOD PRESSURE: 132 MMHG | RESPIRATION RATE: 16 BRPM | HEART RATE: 85 BPM | OXYGEN SATURATION: 96 % | TEMPERATURE: 98.6 F | WEIGHT: 260 LBS | BODY MASS INDEX: 44.39 KG/M2 | DIASTOLIC BLOOD PRESSURE: 83 MMHG

## 2018-11-26 DIAGNOSIS — M99.83 OTHER BIOMECHANICAL LESIONS OF LUMBAR REGION: ICD-10-CM

## 2018-11-26 DIAGNOSIS — Z86.2 PERSONAL HISTORY OF DISEASES OF THE BLOOD AND BLOOD-FORMING ORGANS AND CERTAIN DISORDERS INVOLVING THE IMMUNE MECHANISM: ICD-10-CM

## 2018-11-26 DIAGNOSIS — F32.9 ANXIETY DISORDER, UNSPECIFIED: ICD-10-CM

## 2018-11-26 DIAGNOSIS — Z87.39 PERSONAL HISTORY OF OTHER DISEASES OF THE MUSCULOSKELETAL SYSTEM AND CONNECTIVE TISSUE: ICD-10-CM

## 2018-11-26 DIAGNOSIS — F41.9 ANXIETY DISORDER, UNSPECIFIED: ICD-10-CM

## 2018-11-26 PROCEDURE — 99205 OFFICE O/P NEW HI 60 MIN: CPT

## 2018-11-27 PROBLEM — Z86.2 HISTORY OF ANEMIA: Status: RESOLVED | Noted: 2018-11-27 | Resolved: 2018-11-27

## 2018-11-27 PROBLEM — Z87.39 HISTORY OF ARTHRITIS: Status: RESOLVED | Noted: 2018-11-27 | Resolved: 2018-11-27

## 2018-11-27 PROBLEM — F41.9 ANXIETY AND DEPRESSION: Status: RESOLVED | Noted: 2018-11-27 | Resolved: 2018-11-27

## 2018-11-27 PROBLEM — M99.83 NEURAL FORAMINAL STENOSIS OF LUMBAR SPINE: Status: ACTIVE | Noted: 2018-11-27

## 2018-11-27 RX ORDER — SERTRALINE HYDROCHLORIDE 50 MG/1
50 TABLET, FILM COATED ORAL
Refills: 0 | Status: ACTIVE | COMMUNITY

## 2018-11-27 RX ORDER — OXYCODONE AND ACETAMINOPHEN 5; 325 MG/1; MG/1
5-325 TABLET ORAL
Refills: 0 | Status: ACTIVE | COMMUNITY

## 2018-12-03 ENCOUNTER — APPOINTMENT (OUTPATIENT)
Dept: NEUROSURGERY | Facility: CLINIC | Age: 63
End: 2018-12-03
Payer: MEDICARE

## 2018-12-10 ENCOUNTER — APPOINTMENT (OUTPATIENT)
Dept: NEUROSURGERY | Facility: CLINIC | Age: 63
End: 2018-12-10
Payer: MEDICARE

## 2018-12-10 VITALS
OXYGEN SATURATION: 95 % | DIASTOLIC BLOOD PRESSURE: 100 MMHG | HEIGHT: 64 IN | BODY MASS INDEX: 44.39 KG/M2 | TEMPERATURE: 98.3 F | HEART RATE: 100 BPM | RESPIRATION RATE: 16 BRPM | SYSTOLIC BLOOD PRESSURE: 180 MMHG | WEIGHT: 260 LBS

## 2018-12-10 PROCEDURE — 99215 OFFICE O/P EST HI 40 MIN: CPT

## 2018-12-11 ENCOUNTER — OUTPATIENT (OUTPATIENT)
Dept: OUTPATIENT SERVICES | Facility: HOSPITAL | Age: 63
LOS: 1 days | End: 2018-12-11

## 2018-12-11 DIAGNOSIS — Z96.651 PRESENCE OF RIGHT ARTIFICIAL KNEE JOINT: Chronic | ICD-10-CM

## 2018-12-11 DIAGNOSIS — Z98.890 OTHER SPECIFIED POSTPROCEDURAL STATES: Chronic | ICD-10-CM

## 2018-12-11 DIAGNOSIS — Z22.321 CARRIER OR SUSPECTED CARRIER OF METHICILLIN SUSCEPTIBLE STAPHYLOCOCCUS AUREUS: ICD-10-CM

## 2018-12-11 DIAGNOSIS — D25.9 LEIOMYOMA OF UTERUS, UNSPECIFIED: Chronic | ICD-10-CM

## 2018-12-17 ENCOUNTER — APPOINTMENT (OUTPATIENT)
Dept: VASCULAR SURGERY | Facility: CLINIC | Age: 63
End: 2018-12-17
Payer: MEDICARE

## 2018-12-17 PROCEDURE — 99203 OFFICE O/P NEW LOW 30 MIN: CPT

## 2018-12-17 PROCEDURE — 93971 EXTREMITY STUDY: CPT

## 2019-01-28 ENCOUNTER — APPOINTMENT (OUTPATIENT)
Dept: HEART AND VASCULAR | Facility: CLINIC | Age: 64
End: 2019-01-28
Payer: MEDICARE

## 2019-01-28 VITALS
TEMPERATURE: 98 F | HEART RATE: 105 BPM | SYSTOLIC BLOOD PRESSURE: 138 MMHG | WEIGHT: 253 LBS | OXYGEN SATURATION: 98 % | HEIGHT: 63.78 IN | DIASTOLIC BLOOD PRESSURE: 70 MMHG | BODY MASS INDEX: 43.73 KG/M2

## 2019-01-28 DIAGNOSIS — Z87.891 PERSONAL HISTORY OF NICOTINE DEPENDENCE: ICD-10-CM

## 2019-01-28 DIAGNOSIS — Z01.810 ENCOUNTER FOR PREPROCEDURAL CARDIOVASCULAR EXAMINATION: ICD-10-CM

## 2019-01-28 DIAGNOSIS — Z78.9 OTHER SPECIFIED HEALTH STATUS: ICD-10-CM

## 2019-01-28 PROCEDURE — 99204 OFFICE O/P NEW MOD 45 MIN: CPT

## 2019-01-28 PROCEDURE — 93000 ELECTROCARDIOGRAM COMPLETE: CPT

## 2019-01-28 NOTE — HISTORY OF PRESENT ILLNESS
[Preoperative Visit] : for a medical evaluation prior to surgery [Surgeon Name ___] : surgeon: [unfilled] [Date of Surgery ___] : on [unfilled] [Good] : Good [Prior Anesthesia] : Prior anesthesia [Electrocardiogram] : ~T an ECG ~C was performed [Echocardiogram] : ~T an echocardiogram ~C was performed [Metabolic Capacity ___Mets%] : The patient has a metabolic capacity of [unfilled] Mets%  [Fair] : Fair [Fever] : no fever [Chills] : no chills [Fatigue] : no fatigue [Chest Pain] : no chest pain [Cough] : no cough [Dyspnea] : no dyspnea [Dysuria] : no dysuria [Urinary Frequency] : no urinary frequency [Nausea] : no nausea [Vomiting] : no vomiting [Diarrhea] : no diarrhea [Abdominal Pain] : no abdominal pain [Easy Bruising] : no easy bruising [Lower Extremity Swelling] : no lower extremity swelling [Diabetes] : no diabetes [Cardiovascular Disease] : no cardiovascular disease [Pulmonary Disease] : no pulmonary disease [Anti-Platelet Agents] : no anti-platelet agents [Nicotine Dependence] : no nicotine dependence [Alcohol Use] : no  alcohol use [Renal Disease] : no renal disease [GI Disease] : no gastrointestinal disease [Sleep Apnea] : no sleep apnea [Thromboembolic Problems] : no thromboembolic problems [Frequent use of NSAIDs] : no use of NSAIDs [Bleeding Disorder] : no bleeding disorder [Transfusion Reaction] : no transfusion reaction [Impaired Immunity] : no impaired immunity [Steroid Use in Last 6 Months] : no steroid use in the last six months [Frequent Aspirin Use] : no frequent aspirin use [Prev Anesthesia Reaction] : no previous anesthesia reaction [Anesthesia Reaction] : no anesthesia reaction [Sudden Death] : no sudden death [Clotting Disorder] : no clotting disorder [de-identified] : bilateral laminectomy, left discectomy at L5-S1 [FreeTextEntry1] : 63 year old woman with past medical history hypertension, obesity, anemia, anxiety, arthritis, depression referred pre op cv eval for above surgery\par \par EKG SR normal intervals twi V3-V6 (UNCHANGED FROM LAST YEAR POST TKR)\par \par sug hx LTKR 2018, TKR 2017 with GA s cardiac complicaitons

## 2019-01-28 NOTE — PHYSICAL EXAM
[General Appearance - Well Developed] : well developed [Normal Appearance] : normal appearance [Well Groomed] : well groomed [General Appearance - Well Nourished] : well nourished [No Deformities] : no deformities [General Appearance - In No Acute Distress] : no acute distress [Normal Conjunctiva] : the conjunctiva exhibited no abnormalities [Eyelids - No Xanthelasma] : the eyelids demonstrated no xanthelasmas [Normal Oral Mucosa] : normal oral mucosa [No Oral Pallor] : no oral pallor [No Oral Cyanosis] : no oral cyanosis [Normal Jugular Venous A Waves Present] : normal jugular venous A waves present [Normal Jugular Venous V Waves Present] : normal jugular venous V waves present [No Jugular Venous Jones A Waves] : no jugular venous jones A waves [Respiration, Rhythm And Depth] : normal respiratory rhythm and effort [Exaggerated Use Of Accessory Muscles For Inspiration] : no accessory muscle use [Auscultation Breath Sounds / Voice Sounds] : lungs were clear to auscultation bilaterally [Heart Rate And Rhythm] : heart rate and rhythm were normal [Heart Sounds] : normal S1 and S2 [Murmurs] : no murmurs present [Abdomen Soft] : soft [Abdomen Tenderness] : non-tender [Abdomen Mass (___ Cm)] : no abdominal mass palpated [Abnormal Walk] : normal gait [Gait - Sufficient For Exercise Testing] : the gait was sufficient for exercise testing [Nail Clubbing] : no clubbing of the fingernails [Cyanosis, Localized] : no localized cyanosis [Petechial Hemorrhages (___cm)] : no petechial hemorrhages [Skin Color & Pigmentation] : normal skin color and pigmentation [] : no rash [No Venous Stasis] : no venous stasis [Skin Lesions] : no skin lesions [No Skin Ulcers] : no skin ulcer [No Xanthoma] : no  xanthoma was observed [Oriented To Time, Place, And Person] : oriented to person, place, and time [Affect] : the affect was normal [Mood] : the mood was normal [No Anxiety] : not feeling anxious

## 2019-01-28 NOTE — DISCUSSION/SUMMARY
[Procedure Intermediate Risk] : the procedure risk is intermediate [Patient Intermediate Risk] : the patient is an intermediate risk [Optimized for Surgery] : the patient is optimized for surgery [As per surgery] : as per surgery [Continue] : Continue medications as currently directed [FreeTextEntry1] : Isidoro CV Score - low risk\par Will obtain 2D echo to eval central pressures\par EKF SR non specific st changes (uncahgned from last year)\par Had two intermediate risk surgery past two consecutive years with GA s complications.\par >4 METS\par ASA Class 3\par not diabetic\par HTN - controleld on current meds\par proceed to surgery\par

## 2019-02-04 DIAGNOSIS — Z01.818 ENCOUNTER FOR OTHER PREPROCEDURAL EXAMINATION: ICD-10-CM

## 2019-02-11 VITALS
DIASTOLIC BLOOD PRESSURE: 71 MMHG | HEIGHT: 64.5 IN | RESPIRATION RATE: 18 BRPM | OXYGEN SATURATION: 95 % | WEIGHT: 255.74 LBS | SYSTOLIC BLOOD PRESSURE: 154 MMHG | HEART RATE: 97 BPM | TEMPERATURE: 96 F

## 2019-02-12 ENCOUNTER — INPATIENT (INPATIENT)
Facility: HOSPITAL | Age: 64
LOS: 2 days | Discharge: HOME | DRG: 519 | End: 2019-02-15
Attending: NEUROLOGICAL SURGERY | Admitting: NEUROLOGICAL SURGERY
Payer: MEDICARE

## 2019-02-12 DIAGNOSIS — Z98.890 OTHER SPECIFIED POSTPROCEDURAL STATES: Chronic | ICD-10-CM

## 2019-02-12 DIAGNOSIS — F41.9 ANXIETY DISORDER, UNSPECIFIED: ICD-10-CM

## 2019-02-12 DIAGNOSIS — Z96.651 PRESENCE OF RIGHT ARTIFICIAL KNEE JOINT: Chronic | ICD-10-CM

## 2019-02-12 DIAGNOSIS — D25.9 LEIOMYOMA OF UTERUS, UNSPECIFIED: Chronic | ICD-10-CM

## 2019-02-12 DIAGNOSIS — Z96.652 PRESENCE OF LEFT ARTIFICIAL KNEE JOINT: Chronic | ICD-10-CM

## 2019-02-12 DIAGNOSIS — I10 ESSENTIAL (PRIMARY) HYPERTENSION: ICD-10-CM

## 2019-02-12 DIAGNOSIS — M48.062 SPINAL STENOSIS, LUMBAR REGION WITH NEUROGENIC CLAUDICATION: ICD-10-CM

## 2019-02-12 LAB
ALBUMIN SERPL ELPH-MCNC: 3.7 G/DL — SIGNIFICANT CHANGE UP (ref 3.3–5)
ALP SERPL-CCNC: 95 U/L — SIGNIFICANT CHANGE UP (ref 40–120)
ALT FLD-CCNC: 21 U/L — SIGNIFICANT CHANGE UP (ref 10–45)
ANION GAP SERPL CALC-SCNC: 11 MMOL/L — SIGNIFICANT CHANGE UP (ref 5–17)
AST SERPL-CCNC: 21 U/L — SIGNIFICANT CHANGE UP (ref 10–40)
BASOPHILS # BLD AUTO: 0.01 K/UL — SIGNIFICANT CHANGE UP (ref 0–0.2)
BASOPHILS NFR BLD AUTO: 0.1 % — SIGNIFICANT CHANGE UP (ref 0–2)
BILIRUB SERPL-MCNC: 0.2 MG/DL — SIGNIFICANT CHANGE UP (ref 0.2–1.2)
BUN SERPL-MCNC: 14 MG/DL — SIGNIFICANT CHANGE UP (ref 7–23)
CALCIUM SERPL-MCNC: 8.8 MG/DL — SIGNIFICANT CHANGE UP (ref 8.4–10.5)
CHLORIDE SERPL-SCNC: 101 MMOL/L — SIGNIFICANT CHANGE UP (ref 96–108)
CO2 SERPL-SCNC: 27 MMOL/L — SIGNIFICANT CHANGE UP (ref 22–31)
CREAT SERPL-MCNC: 0.82 MG/DL — SIGNIFICANT CHANGE UP (ref 0.5–1.3)
EOSINOPHIL # BLD AUTO: 0 K/UL — SIGNIFICANT CHANGE UP (ref 0–0.5)
EOSINOPHIL NFR BLD AUTO: 0 % — SIGNIFICANT CHANGE UP (ref 0–6)
GLUCOSE SERPL-MCNC: 233 MG/DL — HIGH (ref 70–99)
HCT VFR BLD CALC: 33.9 % — LOW (ref 34.5–45)
HGB BLD-MCNC: 10.3 G/DL — LOW (ref 11.5–15.5)
IMM GRANULOCYTES NFR BLD AUTO: 0.3 % — SIGNIFICANT CHANGE UP (ref 0–1.5)
LYMPHOCYTES # BLD AUTO: 0.94 K/UL — LOW (ref 1–3.3)
LYMPHOCYTES # BLD AUTO: 10.3 % — LOW (ref 13–44)
MCHC RBC-ENTMCNC: 25.3 PG — LOW (ref 27–34)
MCHC RBC-ENTMCNC: 30.4 GM/DL — LOW (ref 32–36)
MCV RBC AUTO: 83.3 FL — SIGNIFICANT CHANGE UP (ref 80–100)
MONOCYTES # BLD AUTO: 0.12 K/UL — SIGNIFICANT CHANGE UP (ref 0–0.9)
MONOCYTES NFR BLD AUTO: 1.3 % — LOW (ref 2–14)
NEUTROPHILS # BLD AUTO: 7.99 K/UL — HIGH (ref 1.8–7.4)
NEUTROPHILS NFR BLD AUTO: 88 % — HIGH (ref 43–77)
NRBC # BLD: 0 /100 WBCS — SIGNIFICANT CHANGE UP (ref 0–0)
PLATELET # BLD AUTO: 329 K/UL — SIGNIFICANT CHANGE UP (ref 150–400)
POTASSIUM SERPL-MCNC: 3.5 MMOL/L — SIGNIFICANT CHANGE UP (ref 3.5–5.3)
POTASSIUM SERPL-SCNC: 3.5 MMOL/L — SIGNIFICANT CHANGE UP (ref 3.5–5.3)
PROT SERPL-MCNC: 6.5 G/DL — SIGNIFICANT CHANGE UP (ref 6–8.3)
RBC # BLD: 4.07 M/UL — SIGNIFICANT CHANGE UP (ref 3.8–5.2)
RBC # FLD: 17.5 % — HIGH (ref 10.3–14.5)
SODIUM SERPL-SCNC: 139 MMOL/L — SIGNIFICANT CHANGE UP (ref 135–145)
WBC # BLD: 9.09 K/UL — SIGNIFICANT CHANGE UP (ref 3.8–10.5)
WBC # FLD AUTO: 9.09 K/UL — SIGNIFICANT CHANGE UP (ref 3.8–10.5)

## 2019-02-12 PROCEDURE — 63047 LAM FACETEC & FORAMOT LUMBAR: CPT | Mod: AS

## 2019-02-12 PROCEDURE — 63047 LAM FACETEC & FORAMOT LUMBAR: CPT

## 2019-02-12 RX ORDER — AMLODIPINE BESYLATE 2.5 MG/1
10 TABLET ORAL DAILY
Qty: 0 | Refills: 0 | Status: DISCONTINUED | OUTPATIENT
Start: 2019-02-12 | End: 2019-02-15

## 2019-02-12 RX ORDER — SERTRALINE 25 MG/1
50 TABLET, FILM COATED ORAL DAILY
Qty: 0 | Refills: 0 | Status: DISCONTINUED | OUTPATIENT
Start: 2019-02-12 | End: 2019-02-15

## 2019-02-12 RX ORDER — ONDANSETRON 8 MG/1
4 TABLET, FILM COATED ORAL EVERY 6 HOURS
Qty: 0 | Refills: 0 | Status: DISCONTINUED | OUTPATIENT
Start: 2019-02-12 | End: 2019-02-15

## 2019-02-12 RX ORDER — VANCOMYCIN HCL 1 G
1500 VIAL (EA) INTRAVENOUS EVERY 12 HOURS
Qty: 0 | Refills: 0 | Status: COMPLETED | OUTPATIENT
Start: 2019-02-12 | End: 2019-02-13

## 2019-02-12 RX ORDER — HYDROMORPHONE HYDROCHLORIDE 2 MG/ML
0.5 INJECTION INTRAMUSCULAR; INTRAVENOUS; SUBCUTANEOUS
Qty: 0 | Refills: 0 | Status: DISCONTINUED | OUTPATIENT
Start: 2019-02-12 | End: 2019-02-13

## 2019-02-12 RX ORDER — SODIUM CHLORIDE 9 MG/ML
1000 INJECTION INTRAMUSCULAR; INTRAVENOUS; SUBCUTANEOUS
Qty: 0 | Refills: 0 | Status: DISCONTINUED | OUTPATIENT
Start: 2019-02-12 | End: 2019-02-13

## 2019-02-12 RX ORDER — SERTRALINE 25 MG/1
1 TABLET, FILM COATED ORAL
Qty: 0 | Refills: 0 | COMMUNITY

## 2019-02-12 RX ORDER — DIAZEPAM 5 MG
5 TABLET ORAL EVERY 6 HOURS
Qty: 0 | Refills: 0 | Status: DISCONTINUED | OUTPATIENT
Start: 2019-02-12 | End: 2019-02-15

## 2019-02-12 RX ORDER — BENZOCAINE AND MENTHOL 5; 1 G/100ML; G/100ML
1 LIQUID ORAL
Qty: 0 | Refills: 0 | Status: DISCONTINUED | OUTPATIENT
Start: 2019-02-12 | End: 2019-02-15

## 2019-02-12 RX ORDER — DOCUSATE SODIUM 100 MG
100 CAPSULE ORAL THREE TIMES A DAY
Qty: 0 | Refills: 0 | Status: DISCONTINUED | OUTPATIENT
Start: 2019-02-12 | End: 2019-02-15

## 2019-02-12 RX ORDER — POTASSIUM CHLORIDE 20 MEQ
0 PACKET (EA) ORAL
Qty: 0 | Refills: 0 | COMMUNITY

## 2019-02-12 RX ORDER — ACETAMINOPHEN 500 MG
650 TABLET ORAL EVERY 6 HOURS
Qty: 0 | Refills: 0 | Status: DISCONTINUED | OUTPATIENT
Start: 2019-02-12 | End: 2019-02-15

## 2019-02-12 RX ORDER — AMLODIPINE BESYLATE 2.5 MG/1
1 TABLET ORAL
Qty: 0 | Refills: 0 | COMMUNITY

## 2019-02-12 RX ORDER — SENNA PLUS 8.6 MG/1
2 TABLET ORAL AT BEDTIME
Qty: 0 | Refills: 0 | Status: DISCONTINUED | OUTPATIENT
Start: 2019-02-12 | End: 2019-02-15

## 2019-02-12 RX ORDER — PANTOPRAZOLE SODIUM 20 MG/1
40 TABLET, DELAYED RELEASE ORAL DAILY
Qty: 0 | Refills: 0 | Status: DISCONTINUED | OUTPATIENT
Start: 2019-02-12 | End: 2019-02-13

## 2019-02-12 RX ORDER — HYDROMORPHONE HYDROCHLORIDE 2 MG/ML
30 INJECTION INTRAMUSCULAR; INTRAVENOUS; SUBCUTANEOUS
Qty: 0 | Refills: 0 | Status: DISCONTINUED | OUTPATIENT
Start: 2019-02-12 | End: 2019-02-13

## 2019-02-12 RX ADMIN — HYDROMORPHONE HYDROCHLORIDE 30 MILLILITER(S): 2 INJECTION INTRAMUSCULAR; INTRAVENOUS; SUBCUTANEOUS at 13:08

## 2019-02-12 RX ADMIN — SENNA PLUS 2 TABLET(S): 8.6 TABLET ORAL at 21:27

## 2019-02-12 RX ADMIN — Medication 100 MILLIGRAM(S): at 21:27

## 2019-02-12 RX ADMIN — PANTOPRAZOLE SODIUM 40 MILLIGRAM(S): 20 TABLET, DELAYED RELEASE ORAL at 15:34

## 2019-02-12 RX ADMIN — BENZOCAINE AND MENTHOL 1 LOZENGE: 5; 1 LIQUID ORAL at 18:37

## 2019-02-12 RX ADMIN — Medication 300 MILLIGRAM(S): at 22:14

## 2019-02-12 NOTE — BRIEF OPERATIVE NOTE - PROCEDURE
Laminectomy  02/12/2019  and discectomy, left L5-S1  Active  NSTASI <<-----Click on this checkbox to enter Procedure

## 2019-02-12 NOTE — H&P ADULT - PROBLEM SELECTOR PLAN 1
-Admit to neurosurgery, Bernard  -L5-S1 lami/discectomy  -Pain control  -DVt/GI ppx  -PT eval  -Dispo planning

## 2019-02-12 NOTE — PROGRESS NOTE ADULT - SUBJECTIVE AND OBJECTIVE BOX
Subjective:  NEUROSURGERY POC:  62 yo female s/p left L5-S1 for disc herniation. No intra op complication. Patient in PACU with complaint sore throat, most likely secondary to intubation.    T(C): 36.5 (02-12-19 @ 14:30), Max: 36.5 (02-12-19 @ 14:30)  HR: 84 (02-12-19 @ 14:55) (84 - 96)  BP: 131/63 (02-12-19 @ 14:55) (123/59 - 141/64)  RR: 19 (02-12-19 @ 14:55) (17 - 32)  SpO2: 96% (02-12-19 @ 14:55) (95% - 98%)  Wt(kg): --    Exam: A&O x 3, PERRL, EOMI  back: surgical dressing is dry and clean, no tenderness  Hemovac in place producing minimal bloody fluid.  Ext: no focal motor deficit  5/5 x 4  No sensory deficit to touch    Wound: dry and clean, Hemovac in place.    Imaging: none    Assessment/Plan: 63 female s/p day zero L5-S1 left laminectomy ,discectomy. doing well  Plan: Vancomycin post op  Pain control  Regional bed  OOB. PT consult  DW Dr. Wagner.

## 2019-02-12 NOTE — H&P ADULT - NSHPPHYSICALEXAM_GEN_ALL_CORE
A/Ox3, follows commands, speech clear  Adrian 5/5 strength  Sensation intact light touch all dermatomes

## 2019-02-12 NOTE — H&P ADULT - PMH
Anxiety and depression    HNP (herniated nucleus pulposus), lumbar    HTN (hypertension)    Lumbar stenosis    Obesity    Osteoarthritis of both knees    Radiculopathy of lumbar region    Vitamin D deficiency

## 2019-02-12 NOTE — H&P ADULT - PSH
Fibroid, uterine  REMOVED  H/O decompression of ulnar nerve  RIGHT ARM  H/O total knee replacement, left    History of total knee replacement, right

## 2019-02-12 NOTE — H&P ADULT - HISTORY OF PRESENT ILLNESS
Information obtained from Dr. Wagner' office note;    63 year-old female presenting with long standing history of low back pain. Worse with movement, improved with rest.  Failed conservative treatments.  Imaging revealed left L5-S1 lateral recess stenosis and disc herniation.  Here for further surgical treatment.

## 2019-02-13 LAB
ANION GAP SERPL CALC-SCNC: 10 MMOL/L — SIGNIFICANT CHANGE UP (ref 5–17)
BUN SERPL-MCNC: 14 MG/DL — SIGNIFICANT CHANGE UP (ref 7–23)
CALCIUM SERPL-MCNC: 8.7 MG/DL — SIGNIFICANT CHANGE UP (ref 8.4–10.5)
CHLORIDE SERPL-SCNC: 104 MMOL/L — SIGNIFICANT CHANGE UP (ref 96–108)
CO2 SERPL-SCNC: 27 MMOL/L — SIGNIFICANT CHANGE UP (ref 22–31)
CREAT SERPL-MCNC: 0.82 MG/DL — SIGNIFICANT CHANGE UP (ref 0.5–1.3)
GLUCOSE SERPL-MCNC: 137 MG/DL — HIGH (ref 70–99)
HCT VFR BLD CALC: 31.6 % — LOW (ref 34.5–45)
HGB BLD-MCNC: 9.6 G/DL — LOW (ref 11.5–15.5)
MAGNESIUM SERPL-MCNC: 1.9 MG/DL — SIGNIFICANT CHANGE UP (ref 1.6–2.6)
MCHC RBC-ENTMCNC: 25.3 PG — LOW (ref 27–34)
MCHC RBC-ENTMCNC: 30.4 GM/DL — LOW (ref 32–36)
MCV RBC AUTO: 83.2 FL — SIGNIFICANT CHANGE UP (ref 80–100)
NRBC # BLD: 0 /100 WBCS — SIGNIFICANT CHANGE UP (ref 0–0)
PHOSPHATE SERPL-MCNC: 3.4 MG/DL — SIGNIFICANT CHANGE UP (ref 2.5–4.5)
PLATELET # BLD AUTO: 326 K/UL — SIGNIFICANT CHANGE UP (ref 150–400)
POTASSIUM SERPL-MCNC: 3.1 MMOL/L — LOW (ref 3.5–5.3)
POTASSIUM SERPL-SCNC: 3.1 MMOL/L — LOW (ref 3.5–5.3)
RBC # BLD: 3.8 M/UL — SIGNIFICANT CHANGE UP (ref 3.8–5.2)
RBC # FLD: 17.5 % — HIGH (ref 10.3–14.5)
SODIUM SERPL-SCNC: 141 MMOL/L — SIGNIFICANT CHANGE UP (ref 135–145)
WBC # BLD: 10.9 K/UL — HIGH (ref 3.8–10.5)
WBC # FLD AUTO: 10.9 K/UL — HIGH (ref 3.8–10.5)

## 2019-02-13 RX ORDER — ACETAMINOPHEN 500 MG
1000 TABLET ORAL ONCE
Qty: 0 | Refills: 0 | Status: COMPLETED | OUTPATIENT
Start: 2019-02-13 | End: 2019-02-13

## 2019-02-13 RX ORDER — POTASSIUM CHLORIDE 20 MEQ
20 PACKET (EA) ORAL
Qty: 0 | Refills: 0 | Status: COMPLETED | OUTPATIENT
Start: 2019-02-13 | End: 2019-02-13

## 2019-02-13 RX ORDER — PANTOPRAZOLE SODIUM 20 MG/1
40 TABLET, DELAYED RELEASE ORAL DAILY
Qty: 0 | Refills: 0 | Status: DISCONTINUED | OUTPATIENT
Start: 2019-02-13 | End: 2019-02-15

## 2019-02-13 RX ORDER — OXYCODONE HYDROCHLORIDE 5 MG/1
10 TABLET ORAL EVERY 4 HOURS
Qty: 0 | Refills: 0 | Status: DISCONTINUED | OUTPATIENT
Start: 2019-02-13 | End: 2019-02-15

## 2019-02-13 RX ADMIN — OXYCODONE HYDROCHLORIDE 10 MILLIGRAM(S): 5 TABLET ORAL at 18:55

## 2019-02-13 RX ADMIN — Medication 20 MILLIEQUIVALENT(S): at 08:04

## 2019-02-13 RX ADMIN — OXYCODONE HYDROCHLORIDE 10 MILLIGRAM(S): 5 TABLET ORAL at 19:55

## 2019-02-13 RX ADMIN — SERTRALINE 50 MILLIGRAM(S): 25 TABLET, FILM COATED ORAL at 12:44

## 2019-02-13 RX ADMIN — PANTOPRAZOLE SODIUM 40 MILLIGRAM(S): 20 TABLET, DELAYED RELEASE ORAL at 12:43

## 2019-02-13 RX ADMIN — Medication 400 MILLIGRAM(S): at 10:32

## 2019-02-13 RX ADMIN — Medication 650 MILLIGRAM(S): at 05:11

## 2019-02-13 RX ADMIN — AMLODIPINE BESYLATE 10 MILLIGRAM(S): 2.5 TABLET ORAL at 06:38

## 2019-02-13 RX ADMIN — OXYCODONE HYDROCHLORIDE 10 MILLIGRAM(S): 5 TABLET ORAL at 13:45

## 2019-02-13 RX ADMIN — Medication 300 MILLIGRAM(S): at 11:06

## 2019-02-13 RX ADMIN — Medication 20 MILLIEQUIVALENT(S): at 12:44

## 2019-02-13 RX ADMIN — SENNA PLUS 2 TABLET(S): 8.6 TABLET ORAL at 21:49

## 2019-02-13 RX ADMIN — Medication 100 MILLIGRAM(S): at 14:39

## 2019-02-13 RX ADMIN — Medication 100 MILLIGRAM(S): at 21:49

## 2019-02-13 RX ADMIN — Medication 650 MILLIGRAM(S): at 06:00

## 2019-02-13 RX ADMIN — Medication 1000 MILLIGRAM(S): at 10:47

## 2019-02-13 RX ADMIN — OXYCODONE HYDROCHLORIDE 10 MILLIGRAM(S): 5 TABLET ORAL at 12:45

## 2019-02-13 RX ADMIN — Medication 100 MILLIGRAM(S): at 06:38

## 2019-02-13 RX ADMIN — Medication 20 MILLIEQUIVALENT(S): at 10:32

## 2019-02-13 NOTE — OCCUPATIONAL THERAPY INITIAL EVALUATION ADULT - GENERAL OBSERVATIONS, REHAB EVAL
Patient right hand dominant. Chart reviewed, JENNIFER Baker cleared patient for OT evaluation. Patient received supine in bed, son in room, NAD, +IV, +SCDs, +hemovac.

## 2019-02-13 NOTE — PROGRESS NOTE ADULT - SUBJECTIVE AND OBJECTIVE BOX
HPI:  Information obtained from Dr. Wagner' office note;    63 year-old female presenting with long standing history of low back pain. Worse with movement, improved with rest.  Failed conservative treatments.  Imaging revealed left L5-S1 lateral recess stenosis and disc herniation.  Here for further surgical treatment. (12 Feb 2019 07:08)    OVERNIGHT EVENTS:  Vital Signs Last 24 Hrs  T(C): 37.2 (13 Feb 2019 08:28), Max: 37.2 (13 Feb 2019 08:28)  T(F): 99 (13 Feb 2019 08:28), Max: 99 (13 Feb 2019 08:28)  HR: 84 (13 Feb 2019 08:28) (78 - 97)  BP: 130/64 (13 Feb 2019 08:28) (111/57 - 150/80)  BP(mean): 76 (12 Feb 2019 17:00) (76 - 95)  RR: 18 (13 Feb 2019 08:28) (16 - 32)  SpO2: 96% (13 Feb 2019 08:28) (95% - 98%)    I&O's Summary    12 Feb 2019 07:01  -  13 Feb 2019 07:00  --------------------------------------------------------  IN: 2680 mL / OUT: 2375 mL / NET: 305 mL        PHYSICAL EXAM:  Neurological:  A&OX3 Cranial nerves intact  MOCK 5/5  Hemovac moderate amounts         Cardiovascular:RRR  Respiratory: Lungs CTAB  Gastrointestinal: +BS  Genitourinary: voiding without  Extremities: warm and dry  Incision/Wound: CDi      DIET: Regular  LABS:                        9.6    10.90 )-----------( 326      ( 13 Feb 2019 06:30 )             31.6     02-13    141  |  104  |  14  ----------------------------<  137<H>  3.1<L>   |  27  |  0.82    Ca    8.7      13 Feb 2019 06:29  Phos  3.4     02-13  Mg     1.9     02-13    TPro  6.5  /  Alb  3.7  /  TBili  0.2  /  DBili  x   /  AST  21  /  ALT  21  /  AlkPhos  95  02-12      CAPILLARY BLOOD GLUCOSE      Drug Levels: [] N/A    CSF Analysis: [] N/A      Allergies    FIGS (Hives)  No Known Drug Allergies    Intolerances      MEDICATIONS:  Antibiotics:  vancomycin  IVPB 1500 milliGRAM(s) IV Intermittent every 12 hours    Neuro:  acetaminophen   Tablet .. 650 milliGRAM(s) Oral every 6 hours PRN  acetaminophen  IVPB .. 1000 milliGRAM(s) IV Intermittent once  diazepam    Tablet 5 milliGRAM(s) Oral every 6 hours PRN  ondansetron Injectable 4 milliGRAM(s) IV Push every 6 hours PRN  oxyCODONE    IR 10 milliGRAM(s) Oral every 4 hours PRN  sertraline 50 milliGRAM(s) Oral daily    Anticoagulation:    OTHER:  amLODIPine   Tablet 10 milliGRAM(s) Oral daily  benzocaine 15 mG/menthol 3.6 mG Lozenge 1 Lozenge Oral every 3 hours PRN  docusate sodium 100 milliGRAM(s) Oral three times a day  pantoprazole    Tablet 40 milliGRAM(s) Oral daily  senna 2 Tablet(s) Oral at bedtime    IVF:  potassium chloride    Tablet ER 20 milliEquivalent(s) Oral every 2 hours      ASSESSMENT:  63y Female s/pPOD#1 L5-S1 left laminectomy, discectomy    PLAN:  NEURO:    Monitor neuro status  Monitor Hemovac output  Once drain is DC obtain plain films  Pain Managment  Readjust Pain meds  DC PCA  start oxycodone  Continue current medical regime    Dispo: Discussed with attending

## 2019-02-13 NOTE — OCCUPATIONAL THERAPY INITIAL EVALUATION ADULT - MD ORDER
Per chart, 63 year-old female presenting with long standing history of low back pain. Worse with movement, improved with rest.  Failed conservative treatments.  Imaging revealed left L5-S1 lateral recess stenosis and disc herniation.  Here for further surgical treatment.

## 2019-02-13 NOTE — OCCUPATIONAL THERAPY INITIAL EVALUATION ADULT - ADDITIONAL COMMENTS
Patient reports full independence with ADL PTA, uses a SC and progressed to a RW for the past couple weeks 2/2 pain. Denies other use of AE/AD. Reports son assists with IADL management.

## 2019-02-13 NOTE — OCCUPATIONAL THERAPY INITIAL EVALUATION ADULT - GROSSLY INTACT, SENSORY
Left UE/Right UE/Grossly Intact/Patient reports decreased sensation in L calf (85% as compared to R calf 100%)/Left LE/Right LE

## 2019-02-13 NOTE — PHYSICAL THERAPY INITIAL EVALUATION ADULT - PERTINENT HX OF CURRENT PROBLEM, REHAB EVAL
63 year-old female presenting with long standing history of low back pain. Worse with movement, improved with rest.  Failed conservative treatments.  Imaging revealed left L5-S1 lateral recess stenosis and disc herniation.  Here for further surgical treatment.

## 2019-02-14 ENCOUNTER — TRANSCRIPTION ENCOUNTER (OUTPATIENT)
Age: 64
End: 2019-02-14

## 2019-02-14 LAB
ANION GAP SERPL CALC-SCNC: 10 MMOL/L — SIGNIFICANT CHANGE UP (ref 5–17)
BUN SERPL-MCNC: 9 MG/DL — SIGNIFICANT CHANGE UP (ref 7–23)
CALCIUM SERPL-MCNC: 8.8 MG/DL — SIGNIFICANT CHANGE UP (ref 8.4–10.5)
CHLORIDE SERPL-SCNC: 104 MMOL/L — SIGNIFICANT CHANGE UP (ref 96–108)
CO2 SERPL-SCNC: 29 MMOL/L — SIGNIFICANT CHANGE UP (ref 22–31)
CREAT SERPL-MCNC: 0.69 MG/DL — SIGNIFICANT CHANGE UP (ref 0.5–1.3)
GLUCOSE SERPL-MCNC: 121 MG/DL — HIGH (ref 70–99)
HCT VFR BLD CALC: 34.4 % — LOW (ref 34.5–45)
HGB BLD-MCNC: 10.2 G/DL — LOW (ref 11.5–15.5)
MAGNESIUM SERPL-MCNC: 2 MG/DL — SIGNIFICANT CHANGE UP (ref 1.6–2.6)
MCHC RBC-ENTMCNC: 25 PG — LOW (ref 27–34)
MCHC RBC-ENTMCNC: 29.7 GM/DL — LOW (ref 32–36)
MCV RBC AUTO: 84.3 FL — SIGNIFICANT CHANGE UP (ref 80–100)
NRBC # BLD: 0 /100 WBCS — SIGNIFICANT CHANGE UP (ref 0–0)
PHOSPHATE SERPL-MCNC: 3 MG/DL — SIGNIFICANT CHANGE UP (ref 2.5–4.5)
PLATELET # BLD AUTO: 318 K/UL — SIGNIFICANT CHANGE UP (ref 150–400)
POTASSIUM SERPL-MCNC: 3.6 MMOL/L — SIGNIFICANT CHANGE UP (ref 3.5–5.3)
POTASSIUM SERPL-SCNC: 3.6 MMOL/L — SIGNIFICANT CHANGE UP (ref 3.5–5.3)
RBC # BLD: 4.08 M/UL — SIGNIFICANT CHANGE UP (ref 3.8–5.2)
RBC # FLD: 17.8 % — HIGH (ref 10.3–14.5)
SODIUM SERPL-SCNC: 143 MMOL/L — SIGNIFICANT CHANGE UP (ref 135–145)
WBC # BLD: 9.9 K/UL — SIGNIFICANT CHANGE UP (ref 3.8–10.5)
WBC # FLD AUTO: 9.9 K/UL — SIGNIFICANT CHANGE UP (ref 3.8–10.5)

## 2019-02-14 RX ORDER — ENOXAPARIN SODIUM 100 MG/ML
40 INJECTION SUBCUTANEOUS AT BEDTIME
Qty: 0 | Refills: 0 | Status: DISCONTINUED | OUTPATIENT
Start: 2019-02-14 | End: 2019-02-15

## 2019-02-14 RX ADMIN — OXYCODONE HYDROCHLORIDE 10 MILLIGRAM(S): 5 TABLET ORAL at 00:06

## 2019-02-14 RX ADMIN — OXYCODONE HYDROCHLORIDE 10 MILLIGRAM(S): 5 TABLET ORAL at 01:06

## 2019-02-14 RX ADMIN — OXYCODONE HYDROCHLORIDE 10 MILLIGRAM(S): 5 TABLET ORAL at 22:33

## 2019-02-14 RX ADMIN — OXYCODONE HYDROCHLORIDE 10 MILLIGRAM(S): 5 TABLET ORAL at 06:04

## 2019-02-14 RX ADMIN — AMLODIPINE BESYLATE 10 MILLIGRAM(S): 2.5 TABLET ORAL at 05:04

## 2019-02-14 RX ADMIN — OXYCODONE HYDROCHLORIDE 10 MILLIGRAM(S): 5 TABLET ORAL at 09:24

## 2019-02-14 RX ADMIN — OXYCODONE HYDROCHLORIDE 10 MILLIGRAM(S): 5 TABLET ORAL at 21:33

## 2019-02-14 RX ADMIN — OXYCODONE HYDROCHLORIDE 10 MILLIGRAM(S): 5 TABLET ORAL at 16:25

## 2019-02-14 RX ADMIN — ENOXAPARIN SODIUM 40 MILLIGRAM(S): 100 INJECTION SUBCUTANEOUS at 22:15

## 2019-02-14 RX ADMIN — SENNA PLUS 2 TABLET(S): 8.6 TABLET ORAL at 21:33

## 2019-02-14 RX ADMIN — OXYCODONE HYDROCHLORIDE 10 MILLIGRAM(S): 5 TABLET ORAL at 10:24

## 2019-02-14 RX ADMIN — OXYCODONE HYDROCHLORIDE 10 MILLIGRAM(S): 5 TABLET ORAL at 05:04

## 2019-02-14 RX ADMIN — OXYCODONE HYDROCHLORIDE 10 MILLIGRAM(S): 5 TABLET ORAL at 17:25

## 2019-02-14 RX ADMIN — Medication 5 MILLIGRAM(S): at 11:45

## 2019-02-14 RX ADMIN — Medication 100 MILLIGRAM(S): at 21:33

## 2019-02-14 RX ADMIN — SERTRALINE 50 MILLIGRAM(S): 25 TABLET, FILM COATED ORAL at 11:43

## 2019-02-14 RX ADMIN — PANTOPRAZOLE SODIUM 40 MILLIGRAM(S): 20 TABLET, DELAYED RELEASE ORAL at 11:42

## 2019-02-14 NOTE — DISCHARGE NOTE ADULT - CARE PROVIDERS DIRECT ADDRESSES
[email or web address]

## 2019-02-14 NOTE — DISCHARGE NOTE ADULT - ADDITIONAL INSTRUCTIONS
Resume home diet. Limited physical activity, follow physical therapist instructions. Call office - [phone] for any question or problem. call for fever greater than 101F, for fluid discharge from wound, for any new neurological change. Follow up with Dr. Wagner - neurosurgery and  of pain management. Contact Dr. Zhang for pain related issues - [phone].

## 2019-02-14 NOTE — DISCHARGE NOTE ADULT - MEDICATION SUMMARY - MEDICATIONS TO TAKE
I will START or STAY ON the medications listed below when I get home from the hospital:    Tylenol 325 mg oral tablet  -- 2 tab(s) by mouth every 6 hours, As needed, Temp greater or equal to 38.5C (101.3F)  -- Indication: For mild pain / fever    oxyCODONE 10 mg oral tablet  -- 1 tab(s) by mouth every 4 hours, As needed, for severe  Pain (4 - 6) MDD:6  -- Indication: For moderate to severe pain    Valium 5 mg oral tablet  -- 1 tab(s) by mouth every 8 hours, As Needed -muscle spasms - for muscle spasm MDD:3  -- Indication: For muscle spasm    sertraline 50 mg oral tablet  -- 1 tab(s) by mouth once a day  -- Indication: For Anxiety and depression    amLODIPine 10 mg oral tablet  -- 1 tab(s) by mouth once a day  -- Indication: For Hypertension, unspecified type    hydroCHLOROthiazide 25 mg oral tablet  -- 1 tab(s) by mouth once a day  -- Indication: For Hypertension, unspecified type    docusate sodium 100 mg oral capsule  -- 1 cap(s) by mouth 3 times a day  -- Indication: For bowel regimen    senna oral tablet  -- 2 tab(s) by mouth once a day (at bedtime)  -- Indication: For bowel regime    potassium chloride 10 mEq oral capsule, extended release  -- orally once a day  -- Indication: For electrolytes stablilization

## 2019-02-14 NOTE — DISCHARGE NOTE ADULT - NS AS ACTIVITY OBS
Walking-Outdoors allowed/No Heavy lifting/straining/Bathing allowed/Showering allowed/Driving allowed/Walking-Indoors allowed/Do not make important decisions

## 2019-02-14 NOTE — DISCHARGE NOTE ADULT - PLAN OF CARE
return home and regain your independent activity Once home call the office to make an appt with Dr Wagner and Dr Zhang  Able to shower and wash yourself, If the incision get wet pat it dry with clean towel  No lifting anything greater than 10 pounds, no bending or twisting  Any temperature greater than 101.5 degrees F or drainage from the wound call the office

## 2019-02-14 NOTE — DISCHARGE NOTE ADULT - PATIENT PORTAL LINK FT
You can access the [facility] Patient Portal, offered by [facility], by registering with the following website: [email or web address]

## 2019-02-14 NOTE — DISCHARGE NOTE ADULT - CARE PROVIDER_API CALL
Brian Wagner)  Neurosurgery  [address] [place] [address]  Phone: [phone]  Fax: [phone]  Follow Up Time:     Galindo Zhang)  Anesthesiology; Pain Medicine  [address] [place] [id number]  Phone: [phone]  Fax: [phone]  Follow Up Time:

## 2019-02-14 NOTE — DISCHARGE NOTE ADULT - HOSPITAL COURSE
63 year-old female presenting with long standing history of low back pain. Worse with movement, improved with rest.  Failed conservative treatments.  Imaging revealed left L5-S1 lateral recess stenosis and disc herniation.  Here for further surgical treatment.      pt admitted and underwent Laminectomy  02/12/2019  and discectomy, left L5-S1   Drain inserted and removed 2/15/19  Pt discharge to home on 2/20/19 63 year-old female presenting with long standing history of low back pain. Worse with movement, improved with rest.  Failed conservative treatments.  Imaging revealed left L5-S1 lateral recess stenosis and disc herniation.  Here for further surgical treatment.      pt admitted and underwent Laminectomy  02/12/2019  and discectomy, left L5-S1   Drain inserted and removed 2/15/19  Pt discharge to home on 2/15/19 with home   PT.

## 2019-02-14 NOTE — DISCHARGE NOTE ADULT - CARE PLAN
Principal Discharge DX:	Lumbar stenosis  Goal:	return home and regain your independent activity  Assessment and plan of treatment:	Once home call the office to make an appt with Dr Wagner and Dr Zhang  Able to shower and wash yourself, If the incision get wet pat it dry with clean towel  No lifting anything greater than 10 pounds, no bending or twisting  Any temperature greater than 101.5 degrees F or drainage from the wound call the office

## 2019-02-14 NOTE — PROGRESS NOTE ADULT - SUBJECTIVE AND OBJECTIVE BOX
Neurology Follow up note    Name  DEBBI PADILLA    HPI:  Information obtained from Dr. Wagner' office note;    63 year-old female presenting with long standing history of low back pain. Worse with movement, improved with rest.  Failed conservative treatments.  Imaging revealed left L5-S1 lateral recess stenosis and disc herniation.  Here for further surgical treatment. (12 Feb 2019 07:08)      Interval History - back pain improved - no new radicular symptoms in the LE        REVIEW OF SYSTEMS    Vital Signs Last 24 Hrs  T(C): 37.1 (14 Feb 2019 15:16), Max: 37.2 (13 Feb 2019 20:20)  T(F): 98.8 (14 Feb 2019 15:16), Max: 99 (14 Feb 2019 08:20)  HR: 75 (14 Feb 2019 15:16) (75 - 87)  BP: 131/79 (14 Feb 2019 15:16) (131/71 - 152/89)  BP(mean): --  RR: 16 (14 Feb 2019 15:16) (15 - 17)  SpO2: 97% (14 Feb 2019 15:16) (95% - 97%)    Physical Exam-     Mental Status- awake and alert    Cranial Nerves- nl    Gait and station- no foot drop    Motor- moves  all 4 extremities    Reflexes- decreased    Sensation- no sensory level    Coordination- no tremors    Vascular - no bruits    Medications  acetaminophen   Tablet .. 650 milliGRAM(s) Oral every 6 hours PRN  amLODIPine   Tablet 10 milliGRAM(s) Oral daily  benzocaine 15 mG/menthol 3.6 mG Lozenge 1 Lozenge Oral every 3 hours PRN  diazepam    Tablet 5 milliGRAM(s) Oral every 6 hours PRN  docusate sodium 100 milliGRAM(s) Oral three times a day  ondansetron Injectable 4 milliGRAM(s) IV Push every 6 hours PRN  oxyCODONE    IR 10 milliGRAM(s) Oral every 4 hours PRN  pantoprazole    Tablet 40 milliGRAM(s) Oral daily  senna 2 Tablet(s) Oral at bedtime  sertraline 50 milliGRAM(s) Oral daily      Lab      Radiology    Assessment- Lumbar radiculopathy    Plan Rehab and pain

## 2019-02-14 NOTE — PROGRESS NOTE ADULT - SUBJECTIVE AND OBJECTIVE BOX
HPI:  Information obtained from Dr. Wagner' office note;    63 year-old female presenting with long standing history of low back pain. Worse with movement, improved with rest.  Failed conservative treatments.  Imaging revealed left L5-S1 lateral recess stenosis and disc herniation.  Here for further surgical treatment. (12 Feb 2019 07:08)      2/14 POD#2 [facility], Home PT, neur stable    Exam: A&O x 3, PERRL, EOMI  back: surgical dressing is dry and clean, no tenderness  Hemovac in place producing minimal bloody fluid.  Ext: no focal motor deficit  5/5 x 4  No sensory deficit to touch    Wound: dry and clean, Hemovac in place.    Imaging: none    Assessment/Plan: 63 female s/p POD# 2 L5-S1 left laminectomy ,discectomy  Plan:   Monitor [facility] output,  PT eval- Home PT  Pain control  Regional bed  OOB.  DW Dr. Wagner.

## 2019-02-14 NOTE — DISCHARGE NOTE ADULT - MEDICATION SUMMARY - MEDICATIONS TO STOP TAKING
I will STOP taking the medications listed below when I get home from the hospital:    oxycodone-acetaminophen 7.5 mg-300 mg oral tablet  -- 1 tab(s) by mouth every 6 hours - 2 tab(s) by mouth every 4 hours as needed for moderate to severe pain MDD:6

## 2019-02-15 VITALS
HEART RATE: 90 BPM | DIASTOLIC BLOOD PRESSURE: 70 MMHG | TEMPERATURE: 99 F | OXYGEN SATURATION: 98 % | RESPIRATION RATE: 16 BRPM | SYSTOLIC BLOOD PRESSURE: 136 MMHG

## 2019-02-15 PROCEDURE — 99222 1ST HOSP IP/OBS MODERATE 55: CPT

## 2019-02-15 RX ORDER — DIAZEPAM 5 MG
1 TABLET ORAL
Qty: 40 | Refills: 0 | OUTPATIENT
Start: 2019-02-15 | End: 2019-02-24

## 2019-02-15 RX ORDER — DIAZEPAM 5 MG
1 TABLET ORAL
Qty: 30 | Refills: 0 | OUTPATIENT
Start: 2019-02-15 | End: 2019-02-24

## 2019-02-15 RX ORDER — SENNA PLUS 8.6 MG/1
2 TABLET ORAL
Qty: 0 | Refills: 0 | COMMUNITY
Start: 2019-02-15

## 2019-02-15 RX ORDER — OXYCODONE HYDROCHLORIDE 5 MG/1
1 TABLET ORAL
Qty: 30 | Refills: 0 | OUTPATIENT
Start: 2019-02-15 | End: 2019-02-19

## 2019-02-15 RX ORDER — DIAZEPAM 5 MG
1 TABLET ORAL
Qty: 0 | Refills: 0 | COMMUNITY
Start: 2019-02-15

## 2019-02-15 RX ORDER — DOCUSATE SODIUM 100 MG
1 CAPSULE ORAL
Qty: 0 | Refills: 0 | COMMUNITY
Start: 2019-02-15

## 2019-02-15 RX ORDER — ACETAMINOPHEN 500 MG
2 TABLET ORAL
Qty: 0 | Refills: 0 | COMMUNITY
Start: 2019-02-15

## 2019-02-15 RX ORDER — OXYCODONE HYDROCHLORIDE 5 MG/1
1 TABLET ORAL
Qty: 0 | Refills: 0 | COMMUNITY
Start: 2019-02-15

## 2019-02-15 RX ADMIN — AMLODIPINE BESYLATE 10 MILLIGRAM(S): 2.5 TABLET ORAL at 05:13

## 2019-02-15 RX ADMIN — OXYCODONE HYDROCHLORIDE 10 MILLIGRAM(S): 5 TABLET ORAL at 06:13

## 2019-02-15 RX ADMIN — PANTOPRAZOLE SODIUM 40 MILLIGRAM(S): 20 TABLET, DELAYED RELEASE ORAL at 12:00

## 2019-02-15 RX ADMIN — OXYCODONE HYDROCHLORIDE 10 MILLIGRAM(S): 5 TABLET ORAL at 14:04

## 2019-02-15 RX ADMIN — OXYCODONE HYDROCHLORIDE 10 MILLIGRAM(S): 5 TABLET ORAL at 10:07

## 2019-02-15 RX ADMIN — Medication 100 MILLIGRAM(S): at 05:13

## 2019-02-15 RX ADMIN — OXYCODONE HYDROCHLORIDE 10 MILLIGRAM(S): 5 TABLET ORAL at 15:04

## 2019-02-15 RX ADMIN — Medication 100 MILLIGRAM(S): at 14:04

## 2019-02-15 RX ADMIN — OXYCODONE HYDROCHLORIDE 10 MILLIGRAM(S): 5 TABLET ORAL at 09:07

## 2019-02-15 RX ADMIN — SERTRALINE 50 MILLIGRAM(S): 25 TABLET, FILM COATED ORAL at 12:00

## 2019-02-15 RX ADMIN — OXYCODONE HYDROCHLORIDE 10 MILLIGRAM(S): 5 TABLET ORAL at 05:13

## 2019-02-15 NOTE — PROGRESS NOTE ADULT - SUBJECTIVE AND OBJECTIVE BOX
Neurology Follow up note    Name  DEBBI PADILLA    HPI:  Information obtained from Dr. Wagner' office note;    63 year-old female presenting with long standing history of low back pain. Worse with movement, improved with rest.  Failed conservative treatments.  Imaging revealed left L5-S1 lateral recess stenosis and disc herniation.  Here for further surgical treatment. (12 Feb 2019 07:08)      Interval History - no change in neuro status- no new weakness numbness or tingling        REVIEW OF SYSTEMS    Vital Signs Last 24 Hrs  T(C): 36.8 (15 Feb 2019 05:05), Max: 37.2 (14 Feb 2019 08:20)  T(F): 98.2 (15 Feb 2019 05:05), Max: 99 (14 Feb 2019 08:20)  HR: 89 (15 Feb 2019 05:05) (75 - 89)  BP: 141/77 (15 Feb 2019 05:05) (126/66 - 141/77)  BP(mean): --  RR: 16 (15 Feb 2019 05:05) (15 - 16)  SpO2: 96% (15 Feb 2019 05:05) (95% - 97%)    Physical Exam-     Mental Status- awake and alert    Cranial Nerves- full EOM    Gait and station- n/a    Motor- nl    Reflexes- intact    Sensation- no sensory level    Coordination- no tremors    Vascular - no bruits    Medications  acetaminophen   Tablet .. 650 milliGRAM(s) Oral every 6 hours PRN  amLODIPine   Tablet 10 milliGRAM(s) Oral daily  benzocaine 15 mG/menthol 3.6 mG Lozenge 1 Lozenge Oral every 3 hours PRN  diazepam    Tablet 5 milliGRAM(s) Oral every 6 hours PRN  docusate sodium 100 milliGRAM(s) Oral three times a day  enoxaparin Injectable 40 milliGRAM(s) SubCutaneous at bedtime  ondansetron Injectable 4 milliGRAM(s) IV Push every 6 hours PRN  oxyCODONE    IR 10 milliGRAM(s) Oral every 4 hours PRN  pantoprazole    Tablet 40 milliGRAM(s) Oral daily  senna 2 Tablet(s) Oral at bedtime  sertraline 50 milliGRAM(s) Oral daily      Lab      Radiology    Assessment- Cervical radiculopathy    Plan as per ortho/ NS

## 2019-02-15 NOTE — CONSULT NOTE ADULT - SUBJECTIVE AND OBJECTIVE BOX
REASON FOR CONSULT:    HISTORY OF PRESENT ILLNESS:    63 year-old female presenting with long standing history of low back pain. Worse with movement, improved with rest.  Failed conservative treatments.  Imaging revealed left L5-S1 lateral recess stenosis and disc herniation.  Here for further surgical treatment.      PAST MEDICAL & SURGICAL HISTORY:  Radiculopathy of lumbar region  HNP (herniated nucleus pulposus), lumbar  Lumbar stenosis  Osteoarthritis of both knees  Obesity  Vitamin D deficiency  Anxiety and depression  HTN (hypertension)  H/O total knee replacement, left  History of total knee replacement, right  H/O decompression of ulnar nerve: RIGHT ARM  Fibroid, uterine: REMOVED      [ ] Diabetes   [ ] Hypertension  [ ] Hyperlipidemia  [ ] CAD  [ ] PCI  [ ] CABG    PREVIOUS DIAGNOSTIC TESTING:    [ ] Echocardiogram:  [ ]  Catheterization:  [ ] Stress Test:  	    MEDICATIONS:  amLODIPine   Tablet 10 milliGRAM(s) Oral daily        acetaminophen   Tablet .. 650 milliGRAM(s) Oral every 6 hours PRN  diazepam    Tablet 5 milliGRAM(s) Oral every 6 hours PRN  ondansetron Injectable 4 milliGRAM(s) IV Push every 6 hours PRN  oxyCODONE    IR 10 milliGRAM(s) Oral every 4 hours PRN  sertraline 50 milliGRAM(s) Oral daily    docusate sodium 100 milliGRAM(s) Oral three times a day  pantoprazole    Tablet 40 milliGRAM(s) Oral daily  senna 2 Tablet(s) Oral at bedtime      benzocaine 15 mG/menthol 3.6 mG Lozenge 1 Lozenge Oral every 3 hours PRN  enoxaparin Injectable 40 milliGRAM(s) SubCutaneous at bedtime      FAMILY HISTORY:  Family history of Alzheimer's disease (Father)      SOCIAL HISTORY:    [ x] Non-smoker  [ ] Smoker  [ ] Alcohol    FAMILY HX: NC    Allergies    FIGS (Hives)  No Known Drug Allergies    Intolerances    	    REVIEW OF SYSTEMS:    [x] as per HPI  CONSTITUTIONAL: No fever, weight loss, or fatigue  ENT:  No difficulty hearing, tinnitus, vertigo; No sinus or throat pain  RESPIRATORY: No cough, wheezing, chills or hemoptysis; No Shortness of Breath  CARDIOVASCULAR: No chest pain, palpitations, dizziness, or leg swelling  GASTROINTESTINAL: No abdominal or epigastric pain. No nausea, vomiting, or hematemesis; No diarrhea or constipation. No melena or hematochezia.  GENITOURINARY: No dysuria, frequency, hematuria, or incontinence  NEUROLOGICAL: No headaches, memory loss, loss of strength, numbness, or tremors  MUSCULOSKELETAL: No joint pain or swelling; No muscle, back, or extremity pain  [x] All others negative	  [ ] Unable to obtain    PHYSICAL EXAM:  T(C): 37.2 (02-15-19 @ 08:20), Max: 37.2 (02-15-19 @ 08:20)  HR: 90 (02-15-19 @ 08:20) (75 - 90)  BP: 136/70 (02-15-19 @ 08:20) (126/66 - 141/77)  RR: 16 (02-15-19 @ 08:20) (16 - 16)  SpO2: 98% (02-15-19 @ 08:20) (96% - 98%)  Wt(kg): --  I&O's Summary    14 Feb 2019 07:01  -  15 Feb 2019 07:00  --------------------------------------------------------  IN: 0 mL / OUT: 400 mL / NET: -400 mL        Appearance: Normal	  HEENT:   Normal oral mucosa, PERRL, EOMI	  Lymphatic: No lymphadenopathy  Cardiovascular: Normal S1 S2, No JVD, No murmurs, No edema  Respiratory: Lungs clear to auscultation	  Psychiatry: A & O x 3, Mood & affect appropriate  Gastrointestinal:  Soft, Non-tender, + BS	  Skin: No rashes, No ecchymoses, No cyanosis	  Neurologic: Non-focal  Extremities: Normal range of motion, No clubbing, cyanosis or edema  Vascular: Peripheral pulses palpable 2+ bilaterally    TELEMETRY: 	    ECG:    ECHO:  STRESS:  CATH:  	  RADIOLOGY:  CXR:  CT:  US:   	  	  LABS:	 	    CARDIAC MARKERS:                                  10.2   9.90  )-----------( 318      ( 14 Feb 2019 06:25 )             34.4     02-14    143  |  104  |  9   ----------------------------<  121<H>  3.6   |  29  |  0.69    Ca    8.8      14 Feb 2019 06:25  Phos  3.0     02-14  Mg     2.0     02-14      proBNP:   Lipid Profile:   HgA1c:   TSH:     ASSESSMENT/PLAN: 	    #post op s complications  no cp sob palp    #CAD - ekg non ischemic  comfortable   no acs  no cardiac decompensation    #HTN - resume home norvasc, pain control, bowel regimen, oob

## 2019-02-15 NOTE — PROGRESS NOTE ADULT - SUBJECTIVE AND OBJECTIVE BOX
HPI:  Information obtained from Dr. Wagner' office note;    63 year-old female presenting with long standing history of low back pain. Worse with movement, improved with rest.  Failed conservative treatments.  Imaging revealed left L5-S1 lateral recess stenosis and disc herniation.  Here for further surgical treatment. (12 Feb 2019 07:08)    OVERNIGHT EVENTS:    2/14 POD#2 [facility], Home PT, neur stable  2/15 POD#3 ANGELICA overnight. Neuro stable.    Vital Signs Last 24 Hrs  T(C): 36.8 (14 Feb 2019 20:20), Max: 37.2 (14 Feb 2019 08:20)  T(F): 98.2 (14 Feb 2019 20:20), Max: 99 (14 Feb 2019 08:20)  HR: 75 (14 Feb 2019 20:20) (75 - 87)  BP: 126/66 (14 Feb 2019 20:20) (126/66 - 152/89)  BP(mean): --  RR: 16 (14 Feb 2019 20:20) (15 - 17)  SpO2: 96% (14 Feb 2019 20:20) (95% - 97%)    I&O's Summary    13 Feb 2019 07:01  -  14 Feb 2019 07:00  --------------------------------------------------------  IN: 0 mL / OUT: 1430 mL / NET: -1430 mL    14 Feb 2019 07:01  -  15 Feb 2019 01:53  --------------------------------------------------------  IN: 0 mL / OUT: 375 mL / NET: -375 mL        PHYSICAL EXAM:  Neurological: AA&Ox3, FC, speech coherent  CNII-XII: PERRL, EOMI, face symmetric  Motor: MAEx4 5/5 UE and LE B/L  back: surgical dressing is dry and clean, no tenderness  Hemovac in place producing minimal bloody fluid.    TUBES/LINES:  [] Vital  [] Lumbar Drain  [] Wound Drains  [] Others    DIET:  [] NPO  [x] Mechanical  [] Tube feeds    LABS:                        10.2   9.90  )-----------( 318      ( 14 Feb 2019 06:25 )             34.4     02-14    143  |  104  |  9   ----------------------------<  121<H>  3.6   |  29  |  0.69    Ca    8.8      14 Feb 2019 06:25  Phos  3.0     02-14  Mg     2.0     02-14    CAPILLARY BLOOD GLUCOSE    Drug Levels: [] N/A    CSF Analysis: [] N/A    Allergies    FIGS (Hives)  No Known Drug Allergies    Intolerances      MEDICATIONS:  Antibiotics:    Neuro:  acetaminophen   Tablet .. 650 milliGRAM(s) Oral every 6 hours PRN  diazepam    Tablet 5 milliGRAM(s) Oral every 6 hours PRN  ondansetron Injectable 4 milliGRAM(s) IV Push every 6 hours PRN  oxyCODONE    IR 10 milliGRAM(s) Oral every 4 hours PRN  sertraline 50 milliGRAM(s) Oral daily    Anticoagulation:  enoxaparin Injectable 40 milliGRAM(s) SubCutaneous at bedtime    OTHER:  amLODIPine   Tablet 10 milliGRAM(s) Oral daily  benzocaine 15 mG/menthol 3.6 mG Lozenge 1 Lozenge Oral every 3 hours PRN  docusate sodium 100 milliGRAM(s) Oral three times a day  pantoprazole    Tablet 40 milliGRAM(s) Oral daily  senna 2 Tablet(s) Oral at bedtime    IVF:    CULTURES:    RADIOLOGY & ADDITIONAL TESTS:      ASSESSMENT:  63 female s/p POD#3 L5-S1 left laminectomy ,discectomy    LUMBAR M54.16 M51.27  Family history of Alzheimer's disease (Father)  Handoff  MEWS Score  Radiculopathy of lumbar region  HNP (herniated nucleus pulposus), lumbar  Lumbar stenosis  Osteoarthritis of both knees  Obesity  Vitamin D deficiency  Obesity  Anxiety and depression  HTN (hypertension)  HNP (herniated nucleus pulposus), lumbar  HNP (herniated nucleus pulposus), lumbar  Lumbar stenosis  Anxiety and depression  Hypertension, unspecified type  Spinal stenosis of lumbar region with neurogenic claudication  Laminectomy  H/O total knee replacement, left  History of total knee replacement, right  H/O total knee replacement, right  H/O decompression of ulnar nerve  Fibroid, uterine      PLAN:  -spinal checks  -Monitor [facility] output  -PT eval- home PT  -Pain control prn oxycodone, valium  -OOB  -DVT prophylaxis: Gray, SQL  -D/w Dr. Wagner

## 2019-02-19 PROBLEM — M48.061 SPINAL STENOSIS, LUMBAR REGION WITHOUT NEUROGENIC CLAUDICATION: Chronic | Status: ACTIVE | Noted: 2019-02-12

## 2019-02-19 PROBLEM — M54.16 RADICULOPATHY, LUMBAR REGION: Chronic | Status: ACTIVE | Noted: 2019-02-12

## 2019-02-19 PROBLEM — M51.26 OTHER INTERVERTEBRAL DISC DISPLACEMENT, LUMBAR REGION: Chronic | Status: ACTIVE | Noted: 2019-02-12

## 2019-02-20 DIAGNOSIS — F41.8 OTHER SPECIFIED ANXIETY DISORDERS: ICD-10-CM

## 2019-02-20 DIAGNOSIS — Z79.899 OTHER LONG TERM (CURRENT) DRUG THERAPY: ICD-10-CM

## 2019-02-20 DIAGNOSIS — I10 ESSENTIAL (PRIMARY) HYPERTENSION: ICD-10-CM

## 2019-02-20 DIAGNOSIS — M48.062 SPINAL STENOSIS, LUMBAR REGION WITH NEUROGENIC CLAUDICATION: ICD-10-CM

## 2019-02-20 DIAGNOSIS — M54.16 RADICULOPATHY, LUMBAR REGION: ICD-10-CM

## 2019-02-20 DIAGNOSIS — M51.17 INTERVERTEBRAL DISC DISORDERS WITH RADICULOPATHY, LUMBOSACRAL REGION: ICD-10-CM

## 2019-02-20 DIAGNOSIS — E66.01 MORBID (SEVERE) OBESITY DUE TO EXCESS CALORIES: ICD-10-CM

## 2019-02-26 PROCEDURE — 97535 SELF CARE MNGMENT TRAINING: CPT

## 2019-02-26 PROCEDURE — 97116 GAIT TRAINING THERAPY: CPT

## 2019-02-26 PROCEDURE — C1889: CPT

## 2019-02-26 PROCEDURE — C9399: CPT

## 2019-02-26 PROCEDURE — 76000 FLUOROSCOPY <1 HR PHYS/QHP: CPT

## 2019-02-26 PROCEDURE — 36415 COLL VENOUS BLD VENIPUNCTURE: CPT

## 2019-02-26 PROCEDURE — 85025 COMPLETE CBC W/AUTO DIFF WBC: CPT

## 2019-02-26 PROCEDURE — 83735 ASSAY OF MAGNESIUM: CPT

## 2019-02-26 PROCEDURE — 80048 BASIC METABOLIC PNL TOTAL CA: CPT

## 2019-02-26 PROCEDURE — 85027 COMPLETE CBC AUTOMATED: CPT

## 2019-02-26 PROCEDURE — 84100 ASSAY OF PHOSPHORUS: CPT

## 2019-02-26 PROCEDURE — 97161 PT EVAL LOW COMPLEX 20 MIN: CPT

## 2019-02-26 PROCEDURE — 80053 COMPREHEN METABOLIC PANEL: CPT

## 2019-03-01 ENCOUNTER — APPOINTMENT (OUTPATIENT)
Dept: NEUROSURGERY | Facility: CLINIC | Age: 64
End: 2019-03-01
Payer: MEDICARE

## 2019-03-01 VITALS
DIASTOLIC BLOOD PRESSURE: 83 MMHG | RESPIRATION RATE: 16 BRPM | TEMPERATURE: 98.5 F | BODY MASS INDEX: 44.83 KG/M2 | OXYGEN SATURATION: 95 % | WEIGHT: 253 LBS | HEIGHT: 63 IN | SYSTOLIC BLOOD PRESSURE: 147 MMHG | HEART RATE: 102 BPM

## 2019-03-01 DIAGNOSIS — G89.18 OTHER ACUTE POSTPROCEDURAL PAIN: ICD-10-CM

## 2019-03-01 PROCEDURE — 99024 POSTOP FOLLOW-UP VISIT: CPT

## 2019-03-01 NOTE — PHYSICAL EXAM
[General Appearance - Alert] : alert [General Appearance - In No Acute Distress] : in no acute distress [Clean] : clean [Dry] : dry [Intact] : intact [No Drainage] : without drainage [Normal Skin] : normal [Oriented To Time, Place, And Person] : oriented to person, place, and time [Motor Tone] : muscle tone was normal in all four extremities [Motor Strength] : muscle strength was normal in all four extremities [Sclera] : the sclera and conjunctiva were normal [Outer Ear] : the ears and nose were normal in appearance [] : no respiratory distress [Respiration, Rhythm And Depth] : normal respiratory rhythm and effort [Heart Rate And Rhythm] : heart rate was normal and rhythm regular [Abnormal Walk] : normal gait [Skin Color & Pigmentation] : normal skin color and pigmentation [FreeTextEntry1] : Lumbar

## 2019-03-01 NOTE — REASON FOR VISIT
[Family Member] : family member [de-identified] : Bilateral lumbar laminectomy, facetectomy, foraminotomy, Left Discectomy L5-S1 [de-identified] : 1/22/19 [de-identified] : Reports she is doing well.\par Denies any signs of postop wound infection which could include but is not limited to redness/swelling/purulent drainage\par Denies CP/SOB/unilateral leg edema. Denies headaches, nausea, vomiting, dizziness, weakness. \par She is slowly introducing preop activities.\par \par She is taking percocet prn for pain every 6-8 hours. Has been using valium prn muscle spasm. \par \par Currently receiving physical therapy in home.\par

## 2019-03-01 NOTE — HISTORY OF PRESENT ILLNESS
[de-identified] : 63 year old woman with past medical history hypertension, obesity, anemia, anxiety, arthritis, depression referred by Dr. Eulogio Camejo for evaluation of lumbar spine. The patient states she has bilateral knee replacement, the last knee replacement surgery was in March 2018. She states after surgery, she developed severe low back pain that radiates to bilateral buttocks and LEFT leg. She states that pain radiates down posterior left leg to foot. Pain is exacerbated by walking and she has difficulty walking. She uses a walker for assistance. She also reports intermittent numbness and tingling down posterior left leg to bottom of left foot. \par Her most severe symptom is severe low back pain.\par \par She reports intermittent LEFT leg swelling, for which she has followed up with her PCP and cardiology but states that despite medications,LLE edema has not improved.\par \par She was seen by pain management doctor earlier this month, Dr. Erazo. She had one LOURDES without relief. Dr. Erazo suggested she see a neurosurgeon for evaluation for spine surgery. She has not done physical therapy.

## 2019-03-01 NOTE — ASSESSMENT
[FreeTextEntry1] : No Bend/Lift/Twist\par Do not lift anything more than 5 pounds. No strenuous activity. \par Shower daily; use mild soap - pat incision line dry with dry separate towel\par Do not apply lotions or ointments over incision\par No tubs, pools for 6-8 weeks\par Avoid direct sun exposure to incision site for 3-6 months\par Gradually increase activities as tolerated\par \par Report all s/s infection including drainage, odor, redness, fever greater than 101.\par Renew percocet x 7 days - education provided regarding medication administration, constipation and prevention Encouraged fluids, colace.\par Will give patient flexeril 5 mg bid as needed for muscle spasm. Do not take percocet and flexeril together. Education provided regarding medication administration and cautioned regarding side effects including drowsiness.\par \par Plan:\par \par 1. Return to the office in one month to see Dr. Wagner\par 2. Continue in home Physical therapy\par 3. Soft back brace for comfort - Rx provided\par \par Patient verbalizes agreement and understanding with plan

## 2019-03-12 ENCOUNTER — RX RENEWAL (OUTPATIENT)
Age: 64
End: 2019-03-12

## 2019-04-05 ENCOUNTER — APPOINTMENT (OUTPATIENT)
Dept: NEUROSURGERY | Facility: CLINIC | Age: 64
End: 2019-04-05
Payer: MEDICARE

## 2019-04-05 VITALS
HEART RATE: 101 BPM | WEIGHT: 253 LBS | HEIGHT: 63 IN | OXYGEN SATURATION: 95 % | BODY MASS INDEX: 44.83 KG/M2 | DIASTOLIC BLOOD PRESSURE: 78 MMHG | SYSTOLIC BLOOD PRESSURE: 115 MMHG | RESPIRATION RATE: 16 BRPM | TEMPERATURE: 98.3 F

## 2019-04-05 DIAGNOSIS — M54.5 LOW BACK PAIN: ICD-10-CM

## 2019-04-05 DIAGNOSIS — M79.605 LOW BACK PAIN: ICD-10-CM

## 2019-04-05 DIAGNOSIS — Z98.890 OTHER SPECIFIED POSTPROCEDURAL STATES: ICD-10-CM

## 2019-04-05 DIAGNOSIS — R26.2 DIFFICULTY IN WALKING, NOT ELSEWHERE CLASSIFIED: ICD-10-CM

## 2019-04-05 DIAGNOSIS — Z09 ENCOUNTER FOR FOLLOW-UP EXAMINATION AFTER COMPLETED TREATMENT FOR CONDITIONS OTHER THAN MALIGNANT NEOPLASM: ICD-10-CM

## 2019-04-05 PROCEDURE — 99024 POSTOP FOLLOW-UP VISIT: CPT

## 2019-04-05 RX ORDER — OXYCODONE AND ACETAMINOPHEN 5; 325 MG/1; MG/1
5-325 TABLET ORAL
Qty: 28 | Refills: 0 | Status: DISCONTINUED | COMMUNITY
Start: 2019-02-19 | End: 2019-04-05

## 2019-04-05 RX ORDER — OXYCODONE AND ACETAMINOPHEN 5; 325 MG/1; MG/1
5-325 TABLET ORAL
Qty: 21 | Refills: 0 | Status: DISCONTINUED | COMMUNITY
Start: 2019-03-01 | End: 2019-04-05

## 2019-04-07 NOTE — HISTORY OF PRESENT ILLNESS
[de-identified] : 63 year old woman with past medical history hypertension, obesity, anemia, anxiety, arthritis, depression referred by Dr. Eulogio Camejo for evaluation of lumbar spine. The patient states she has bilateral knee replacement, the last knee replacement surgery was in March 2018. She states after surgery, she developed severe low back pain that radiates to bilateral buttocks and LEFT leg. She states that pain radiates down posterior left leg to foot. Pain is exacerbated by walking and she has difficulty walking. She uses a walker for assistance. She also reports intermittent numbness and tingling down posterior left leg to bottom of left foot. \par Her most severe symptom is severe low back pain.\par \par She reports intermittent LEFT leg swelling, for which she has followed up with her PCP and cardiology but states that despite medications,LLE edema has not improved.\par \par She was seen by pain management doctor earlier this month, Dr. Erazo. She had one LOURDES without relief. Dr. Erazo suggested she see a neurosurgeon for evaluation for spine surgery. She has not done physical therapy.

## 2019-04-07 NOTE — REASON FOR VISIT
[Family Member] : family member [de-identified] : Bilateral lumbar laminectomy, facetectomy, foraminotomy, Left Discectomy L5-S1 [de-identified] : 1/22/19 [de-identified] : TODAY'S VISIT:\par Has been seen by Dr. Galindo Zhang for pain management.\par Currently taking oxycodone and flexeril prn.\par She continues to do well - reports improvement in LEFT Leg pain.\par She has not started outpatient PT.\par \par \par PREVIOUS VISIT 3/1/19:\par Reports she is doing well.\par Denies any signs of postop wound infection which could include but is not limited to redness/swelling/purulent drainage\par Denies CP/SOB/unilateral leg edema. Denies headaches, nausea, vomiting, dizziness, weakness. \par She is slowly introducing preop activities.\par \par She is taking percocet prn for pain every 6-8 hours. Has been using valium prn muscle spasm. \par Currently receiving physical therapy in home.\par

## 2019-04-07 NOTE — ASSESSMENT
[FreeTextEntry1] : Patient doing well postoperatively. Slowly returning to daily activities.\par She has returned to work\par No Bend/Lift/Twist\par Gradually increase activities as tolerated\par Continue Physical therapy - RX provided.\par Notify MD or report to ED for worsening neurological s/s, including but not limited to weakness, incontinence gait disturbance.\par Return to the office in 3 months.\par \par Patient verbalizes agreement and understanding with plan. \par \par \par

## 2019-04-07 NOTE — PHYSICAL EXAM
[General Appearance - Alert] : alert [General Appearance - In No Acute Distress] : in no acute distress [Clean] : clean [Dry] : dry [Intact] : intact [No Drainage] : without drainage [Normal Skin] : normal [Motor Tone] : muscle tone was normal in all four extremities [Motor Strength] : muscle strength was normal in all four extremities [Sclera] : the sclera and conjunctiva were normal [Outer Ear] : the ears and nose were normal in appearance [Neck Appearance] : the appearance of the neck was normal [] : no respiratory distress [Respiration, Rhythm And Depth] : normal respiratory rhythm and effort [Heart Rate And Rhythm] : heart rate was normal and rhythm regular [Skin Color & Pigmentation] : normal skin color and pigmentation [FreeTextEntry1] : Lower Back

## 2019-04-07 NOTE — ADDENDUM
[FreeTextEntry1] : Doing very well. Left leg radicular pain resolved since surgery. Plan for starting PT 2-3 times per week. Lumbar incision well-healed. Followup in 3 months.\par \par Brian Wagner M.D.

## 2019-04-19 ENCOUNTER — TRANSCRIPTION ENCOUNTER (OUTPATIENT)
Age: 64
End: 2019-04-19

## 2019-04-23 ENCOUNTER — RX RENEWAL (OUTPATIENT)
Age: 64
End: 2019-04-23

## 2019-04-23 RX ORDER — CYCLOBENZAPRINE HYDROCHLORIDE 5 MG/1
5 TABLET, FILM COATED ORAL
Qty: 14 | Refills: 0 | Status: ACTIVE | COMMUNITY
Start: 2019-03-01 | End: 1900-01-01

## 2019-07-02 NOTE — HISTORY OF PRESENT ILLNESS
[de-identified] : 63 year old woman with past medical history hypertension, obesity, anemia, anxiety, arthritis, depression referred by Dr. Eulogio Camejo for evaluation of lumbar spine. The patient states she has bilateral knee replacement, the last knee replacement surgery was in March 2018. She states after surgery, she developed severe low back pain that radiates to bilateral buttocks and LEFT leg. She states that pain radiates down posterior left leg to foot. Pain is exacerbated by walking and she has difficulty walking. She uses a walker for assistance. She also reports intermittent numbness and tingling down posterior left leg to bottom of left foot. \par Her most severe symptom is severe low back pain.\par \par She reports intermittent LEFT leg swelling, for which she has followed up with her PCP and cardiology but states that despite medications,LLE edema has not improved.\par \par She was seen by pain management doctor earlier this month, Dr. Erazo. She had one LOURDES without relief. Dr. Erazo suggested she see a neurosurgeon for evaluation for spine surgery. She has not done physical therapy.

## 2019-07-08 ENCOUNTER — APPOINTMENT (OUTPATIENT)
Dept: NEUROSURGERY | Facility: CLINIC | Age: 64
End: 2019-07-08

## 2019-07-18 NOTE — OCCUPATIONAL THERAPY INITIAL EVALUATION ADULT - IMPAIRED TRANSFERS: SIT/STAND, REHAB EVAL
HISTORY OF PRESENT ILLNESS:  Chief Complaint   Patient presents with   • Refill Request      HPI     Valentine is here to follow up on depression. She was previously seeing a psychiatrist in [place], but would like for me to take over prescribing her Cymbalta. She currently takes Cymbalta 30 mg most days. She was taking 40 mg on some days, but states her depression is well controlled and that she is now consistently taking 30 mg daily. She denies any suicidal thoughts.    PAST MEDICAL, FAMILY AND SOCIAL HISTORIES:   The following histories were personally reviewed with the patient and updated.  Current medications, Allergies, Problem list, Past Medical History and Social History    REVIEW OF SYSTEMS:  Review of Systems   Psychiatric/Behavioral: Negative for depression and suicidal ideas. The patient is not nervous/anxious.      PHYSICAL EXAM:  Visit Vitals  /90 (BP Location: [facility] [place], Patient Position: Sitting, Cuff Size: Regular)   Pulse 86   Ht 5' 0.5\" (1.537 m)   Wt 61.9 kg   BMI 26.22 kg/m²      Physical Exam   Constitutional: She is oriented to person, place, and time and well-developed, well-nourished, and in no distress.   Neurological: She is alert and oriented to person, place, and time.   Skin: Skin is warm and dry.   Psychiatric: Mood and affect normal.     ASSESSMENT AND PLAN:  1. Mild episode of recurrent major depressive disorder (CMS/HCC)  -continue Cymbalta 30 mg daily - I will take over prescribing this for her, as she is no longer going to follow with her psychiatrist  - DULoxetine (CYMBALTA) 30 MG capsule; Take 3 to 4 capsules by mouth daily. Use 4th capsule prior to stressful events.  Dispense: 120 capsule; Refill: 11        impaired balance/pain

## 2019-07-25 NOTE — PATIENT PROFILE ADULT - HARM RISK FACTORS
Implanted port accessed using a 20 gauge non-coring needle primed with 0.9% sodium chloride. Good blood return obtained.  Blood drawn for labs. Flushed with 20 ml 0.9% sodium chloride. Implanted port site WNL. Patient tolerated procedure well. Patient instructed to call office with any questions or problems.      no

## 2019-10-17 NOTE — REASON FOR VISIT
Writer to room to give toradol.  Pt crying stating \"take this out of my arm, I don't like it.\"  Again writer explained importance of IVF's and that non narctic pain medication to be given.  ALso explained pt needs IV for CT scan.  Pt declines again telling writer to take out it out.  She does not want the CT, she wants to go home.  MD cerda   [FreeTextEntry1] : \par TODAY'S VISIT:\par \par \par PREVIOUS OFFICE VISIT 4/5/19:\par Has been seen by Dr. Galindo Zhang for pain management.\par Currently taking oxycodone and flexeril prn.\par She continues to do well - reports improvement in LEFT Leg pain.\par She has not started outpatient PT.\par \par \par PREVIOUS VISIT 3/1/19:\par Reports she is doing well.\par Denies any signs of postop wound infection which could include but is not limited to redness/swelling/purulent drainage\par Denies CP/SOB/unilateral leg edema. Denies headaches, nausea, vomiting, dizziness, weakness. \par She is slowly introducing preop activities.\par \par She is taking percocet prn for pain every 6-8 hours. Has been using valium prn muscle spasm. \par Currently receiving physical therapy in home.\par  [de-identified] : \par \par

## 2020-10-09 NOTE — PATIENT PROFILE ADULT. - PAIN SCALE PREFERRED, PROFILE
[History reviewed] : History reviewed. [Medications and Allergies reviewed] : Medications and allergies reviewed. numerical 0-10

## 2021-06-23 NOTE — ASU PATIENT PROFILE, ADULT - ASSIST WITH
Left message that she is overdue for F/U on gerd and depression(30 minutes). Given phone number to schedule   standing/walking/toileting

## 2021-08-16 NOTE — DISCHARGE NOTE ADULT - PRINCIPAL DIAGNOSIS
Patient is a 50y old  Female who presents with a chief complaint of Weakness of extremities (15 Aug 2021 11:15)      HPI:  49 y/o female (Yakut speaking, translation provided by daughter at bedside) with PMH of T2DM not on prescribe medications, HLD, B12 deficiency with neuropathy, migraines presented to ED with acute weakness, tingling of upper and lower extremities, right > left. Patient states she was in her usual state of health when she developed a headache this morning and she started feeling the weakness to the point she had difficulty walking. She had tingling sensation throughout all 4 extremities for the past 2 days prior to today.     In ED, BP was 160-118 and CT brain was negative for acute pathology.       (14 Aug 2021 15:40)    MRI negative for cva.     REVIEW OF SYSTEMS  Constitutional - No fever, No weight loss, No fatigue  HEENT - No eye pain, No visual disturbances, No difficulty hearing, No tinnitus, No vertigo, No neck pain  Respiratory - No cough, No wheezing, No shortness of breath  Cardiovascular - No chest pain, No palpitations  Gastrointestinal - No abdominal pain, No nausea, No vomiting, No diarrhea, No constipation  Genitourinary - No dysuria, No frequency, No hematuria, No incontinence  Neurological - No headaches, No memory loss, No loss of strength, No numbness, No tremors  Skin - No itching, No rashes, No lesions   Endocrine - No temperature intolerance  Musculoskeletal - No joint pain, No joint swelling, No muscle pain  Psychiatric - No depression, No anxiety    PAST MEDICAL & SURGICAL HISTORY  DM (diabetes mellitus)  HLD (hyperlipidemia)  Migraine  No significant past surgical history      SOCIAL HISTORY  Smoking - Denied  EtOH - Denied   Drugs - Denied    FUNCTIONAL HISTORY  Lives in private house with family, has stairs to ener  Independent at baseline    CURRENT FUNCTIONAL STATUS  Bed Mobility: Supine to Sit:     · Level of [place]	minimum assist (75% patients effort)  · Physical Assist/Nonphysical Assist	1 person assist; verbal cues    Bed Mobility Analysis:     · Impairments Contributing to Impaired Bed Mobility	decreased strength    Transfer: Sit to Stand:     · Level of [place]	minimum assist (75% patients effort)  · Physical Assist/Nonphysical Assist	1 person assist; verbal cues  · Weight-Bearing Restrictions	full weight-bearing    Transfer: Stand to Sit:     · Level of [place]	minimum assist (75% patients effort)  · Physical Assist/Nonphysical Assist	1 person assist; verbal cues  · Weight-Bearing Restrictions	full weight-bearing    Sit/Stand Transfer Safety Analysis:     · Impairments Contributing to Impaired Transfers	decreased strength    Gait Skills:     · Level of [place]	minimum assist (75% patients effort)  · Physical Assist/Nonphysical Assist	1 person assist; verbal cues  · Weight-Bearing Restrictions	full weight-bearing  · Gait Distance	5 lateral steps    Balance Skills Assessment:     · Sitting Balance: Static	good balance  · Sitting Balance: Dynamic	fair plus  · Sit-to-Stand Balance	fair plus  · Standing Balance: Static	fair plus  · Standing Balance: Dynamic	fair plus  · Identified Impairments Contributing to Balance Disturbance	decreased strength      RECENT LABS/IMAGING    < from: MR Head No Cont (08.15.21 @ 11:51) >    EXAM:  MR BRAIN        PROCEDURE DATE:  Aug 15 2021         INTERPRETATION:  Clinical indication: Right-sided weakness.    MRI of the brain was performed using sagittal T1, axial T1 and T2 T2 FLAIR diffusion and susceptibility weighted sequence.    Parenchymal volume loss is seen.    There is no evidence acute hemorrhage mass or mass effect seen    Evaluation of the diffusion weighted sequence demonstrates no abnormal areas of restricted diffusion to suggest acute infarct.    The large vessels demonstrate normal flow voids.    The visualized paranasal sinuses mastoid and middle ear regions appear clear.    IMPRESSION: No acute hemorrhage mass or mass effect.    --- End of Report ---              < end of copied text >    CBC Full  -  ( 15 Aug 2021 07:21 )  WBC Count : 6.51 K/uL  RBC Count : 5.02 M/uL  Hemoglobin : 14.3 g/dL  Hematocrit : 44.2 %  Platelet Count - Automated : 308 K/uL  Mean Cell Volume : 88.0 fL  Mean Cell Hemoglobin : 28.5 pg  Mean Cell Hemoglobin Concentration : 32.4 gm/dL  Auto Neutrophil # : x  Auto Lymphocyte # : x  Auto Monocyte # : x  Auto Eosinophil # : x  Auto Basophil # : x  Auto Neutrophil % : x  Auto Lymphocyte % : x  Auto Monocyte % : x  Auto Eosinophil % : x  Auto Basophil % : x    08-15    141  |  104  |  14  ----------------------------<  144<H>  3.6   |  22  |  0.85    Ca    9.9      15 Aug 2021 06:50  Phos  5.0     08-15  Mg     2.30     08-15    TPro  7.2  /  Alb  4.7  /  TBili  0.7  /  DBili  x   /  AST  20  /  ALT  22  /  AlkPhos  92  08-15        VITALS  T(C): 36.4 (08-16-21 @ 06:32), Max: 37.1 (08-15-21 @ 20:45)  HR: 65 (08-16-21 @ 06:32) (65 - 80)  BP: 111/70 (08-16-21 @ 06:32) (111/70 - 128/60)  RR: 18 (08-16-21 @ 06:32) (17 - 18)  SpO2: 98% (08-16-21 @ 06:32) (97% - 100%)  Wt(kg): --    ALLERGIES  penicillin (Unknown)      MEDICATIONS   aspirin  chewable 81 milliGRAM(s) Oral daily  atorvastatin 80 milliGRAM(s) Oral at bedtime  dextrose 40% Gel 15 Gram(s) Oral once  dextrose 5% + sodium chloride 0.9%. 1000 milliLiter(s) IV Continuous <Continuous>  dextrose 5%. 1000 milliLiter(s) IV Continuous <Continuous>  dextrose 5%. 1000 milliLiter(s) IV Continuous <Continuous>  dextrose 50% Injectable 25 Gram(s) IV Push once  dextrose 50% Injectable 12.5 Gram(s) IV Push once  dextrose 50% Injectable 25 Gram(s) IV Push once  glucagon  Injectable 1 milliGRAM(s) IntraMuscular once  insulin lispro (ADMELOG) corrective regimen sliding scale   SubCutaneous three times a day before meals  insulin lispro (ADMELOG) corrective regimen sliding scale   SubCutaneous at bedtime  pantoprazole  Injectable 40 milliGRAM(s) IV Push daily      ----------------------------------------------------------------------------------------  PHYSICAL EXAM  Constitutional - NAD, Comfortable  HEENT - NCAT, EOMI  Neck - Supple, No limited ROM  Chest - CTA bilaterally, No wheeze, No rhonchi, No crackles  Cardiovascular - RRR, S1S2, No murmurs  Abdomen - BS+, Soft, NTND  Extremities - No C/C/E, No calf tenderness   Neurologic Exam -                    Cognitive - Awake, Alert, AAO to self, place, date, year, situation     Communication - Fluent, No dysarthria     Cranial Nerves - CN 2-12 intact     Motor - No focal deficits                    LEFT    UE - ShAB 5/5, EF 5/5, EE 5/5, WE 5/5,  5/5                    RIGHT UE - ShAB 5/5, EF 5/5, EE 5/5, WE 5/5,  5/5                    LEFT    LE - HF 5/5, KE 5/5, DF 5/5, PF 5/5                    RIGHT LE - HF 5/5, KE 5/5, DF 5/5, PF 5/5        Sensory - Intact to LT     Reflexes - DTR Intact, No primitive reflexive     Coordination - FTN intact     OculoVestibular - No saccades, No nystagmus, VOR         Balance - WNL Static  Psychiatric - Mood stable, Affect WNL  ----------------------------------------------------------------------------------------  ASSESSMENT/PLAN 49 yo F p/w R sided sensory changes and difficulty with ambulation. MRI brain negative.   MRI c spine pending  TTE with bubble pending  continue bedside PT and OT for ambulation, bed mobility, transfers, adls  Pain -denies   Rehab -   Patient is a 50y old  Female who presents with a chief complaint of Weakness of extremities (15 Aug 2021 11:15)      HPI:  51 y/o female (Frisian speaking, translation provided by daughter at bedside) with PMH of T2DM not on prescribe medications, HLD, B12 deficiency with neuropathy, migraines presented to ED with acute weakness, tingling of upper and lower extremities, right > left. Patient states she was in her usual state of health when she developed a headache this morning and she started feeling the weakness to the point she had difficulty walking. She had tingling sensation throughout all 4 extremities for the past 2 days prior to today.     In ED, BP was 160-118 and CT brain was negative for acute pathology.       (14 Aug 2021 15:40)    MRI negative for cva.   Patient had swallow study today  family at bedside.  patient reports R sided weakness and impaired sensation    REVIEW OF SYSTEMS  Constitutional - No fever, No weight loss, No fatigue  HEENT - No eye pain, No visual disturbances, No difficulty hearing, No tinnitus, No vertigo, No neck pain  Respiratory - No cough, No wheezing, No shortness of breath  Cardiovascular - No chest pain, No palpitations  Gastrointestinal - No abdominal pain, No nausea, No vomiting, No diarrhea, No constipation  Genitourinary - No dysuria, No frequency, No hematuria, No incontinence  Neurological - No headaches, No memory loss, + loss of strength, + numbness, No tremors  Skin - No itching, No rashes, No lesions   Endocrine - No temperature intolerance  Musculoskeletal - No joint pain, No joint swelling, No muscle pain  Psychiatric - No depression, No anxiety    PAST MEDICAL & SURGICAL HISTORY  DM (diabetes mellitus)  HLD (hyperlipidemia)  Migraine  No significant past surgical history      SOCIAL HISTORY  Smoking - Denied  EtOH - Denied   Drugs - Denied    FUNCTIONAL HISTORY  Lives in private house with family, has 4 stairs outside to enter, 1 flight to 2nd floor however can stay on main level  Independent at baseline  works as high school     CURRENT FUNCTIONAL STATUS  Bed Mobility: Supine to Sit:     · Level of [place]	minimum assist (75% patients effort)  · Physical Assist/Nonphysical Assist	1 person assist; verbal cues    Bed Mobility Analysis:     · Impairments Contributing to Impaired Bed Mobility	decreased strength    Transfer: Sit to Stand:     · Level of [place]	minimum assist (75% patients effort)  · Physical Assist/Nonphysical Assist	1 person assist; verbal cues  · Weight-Bearing Restrictions	full weight-bearing    Transfer: Stand to Sit:     · Level of [place]	minimum assist (75% patients effort)  · Physical Assist/Nonphysical Assist	1 person assist; verbal cues  · Weight-Bearing Restrictions	full weight-bearing    Sit/Stand Transfer Safety Analysis:     · Impairments Contributing to Impaired Transfers	decreased strength    Gait Skills:     · Level of [place]	minimum assist (75% patients effort)  · Physical Assist/Nonphysical Assist	1 person assist; verbal cues  · Weight-Bearing Restrictions	full weight-bearing  · Gait Distance	5 lateral steps    Balance Skills Assessment:     · Sitting Balance: Static	good balance  · Sitting Balance: Dynamic	fair plus  · Sit-to-Stand Balance	fair plus  · Standing Balance: Static	fair plus  · Standing Balance: Dynamic	fair plus  · Identified Impairments Contributing to Balance Disturbance	decreased strength      RECENT LABS/IMAGING    < from: MR Head No Cont (08.15.21 @ 11:51) >    EXAM:  MR BRAIN        PROCEDURE DATE:  Aug 15 2021         INTERPRETATION:  Clinical indication: Right-sided weakness.    MRI of the brain was performed using sagittal T1, axial T1 and T2 T2 FLAIR diffusion and susceptibility weighted sequence.    Parenchymal volume loss is seen.    There is no evidence acute hemorrhage mass or mass effect seen    Evaluation of the diffusion weighted sequence demonstrates no abnormal areas of restricted diffusion to suggest acute infarct.    The large vessels demonstrate normal flow voids.    The visualized paranasal sinuses mastoid and middle ear regions appear clear.    IMPRESSION: No acute hemorrhage mass or mass effect.    --- End of Report ---              < end of copied text >    CBC Full  -  ( 15 Aug 2021 07:21 )  WBC Count : 6.51 K/uL  RBC Count : 5.02 M/uL  Hemoglobin : 14.3 g/dL  Hematocrit : 44.2 %  Platelet Count - Automated : 308 K/uL  Mean Cell Volume : 88.0 fL  Mean Cell Hemoglobin : 28.5 pg  Mean Cell Hemoglobin Concentration : 32.4 gm/dL  Auto Neutrophil # : x  Auto Lymphocyte # : x  Auto Monocyte # : x  Auto Eosinophil # : x  Auto Basophil # : x  Auto Neutrophil % : x  Auto Lymphocyte % : x  Auto Monocyte % : x  Auto Eosinophil % : x  Auto Basophil % : x    08-15    141  |  104  |  14  ----------------------------<  144<H>  3.6   |  22  |  0.85    Ca    9.9      15 Aug 2021 06:50  Phos  5.0     08-15  Mg     2.30     08-15    TPro  7.2  /  Alb  4.7  /  TBili  0.7  /  DBili  x   /  AST  20  /  ALT  22  /  AlkPhos  92  08-15        VITALS  T(C): 36.4 (08-16-21 @ 06:32), Max: 37.1 (08-15-21 @ 20:45)  HR: 65 (08-16-21 @ 06:32) (65 - 80)  BP: 111/70 (08-16-21 @ 06:32) (111/70 - 128/60)  RR: 18 (08-16-21 @ 06:32) (17 - 18)  SpO2: 98% (08-16-21 @ 06:32) (97% - 100%)  Wt(kg): --    ALLERGIES  penicillin (Unknown)      MEDICATIONS   aspirin  chewable 81 milliGRAM(s) Oral daily  atorvastatin 80 milliGRAM(s) Oral at bedtime  dextrose 40% Gel 15 Gram(s) Oral once  dextrose 5% + sodium chloride 0.9%. 1000 milliLiter(s) IV Continuous <Continuous>  dextrose 5%. 1000 milliLiter(s) IV Continuous <Continuous>  dextrose 5%. 1000 milliLiter(s) IV Continuous <Continuous>  dextrose 50% Injectable 25 Gram(s) IV Push once  dextrose 50% Injectable 12.5 Gram(s) IV Push once  dextrose 50% Injectable 25 Gram(s) IV Push once  glucagon  Injectable 1 milliGRAM(s) IntraMuscular once  insulin lispro (ADMELOG) corrective regimen sliding scale   SubCutaneous three times a day before meals  insulin lispro (ADMELOG) corrective regimen sliding scale   SubCutaneous at bedtime  pantoprazole  Injectable 40 milliGRAM(s) IV Push daily      ----------------------------------------------------------------------------------------  PHYSICAL EXAM  Constitutional - NAD, Comfortable  HEENT - NCAT, EOMI  Chest - no respiratory distress  Cardiovascular - RRR, S1S2   Abdomen -  Soft, NTND  Extremities - No C/C/E, No calf tenderness   Neurologic Exam -                    Cognitive - Awake, Alert, AAO to self, place, date, year, situation     Communication - Fluent, No dysarthria     Cranial Nerves -mild R facial weakness     Motor -                      LEFT    UE - ShAB 5/5, EF 5/5, EE 5/5, WE 5/5,  5/5                    RIGHT UE - ShAB 0/5, EF 3/5, EE 0/5, WE 0/5,  2/5                    LEFT    LE - HF 5/5, KE 5/5, DF 5/5, PF 5/5                    RIGHT LE - HF 1+/5, KE 0/5, DF 0/5, PF 0/5        Sensory - impaired to LT on the right     Reflexes - DTR Intact at patella     OculoVestibular - No saccades, No nystagmus, VOR         Balance - WNL Static  Psychiatric - Mood stable, Affect WNL  ----------------------------------------------------------------------------------------  ASSESSMENT/PLAN 49 yo F p/w R sided weakness and sensory changes, MRI brain negative.   MRI c spine pending  swallow test pending  TTE with bubble pending  SLP- pending clearance  continue bedside PT and OT for ambulation, bed mobility, transfers, adls  Pain -denies   Rehab -  patient with R sided weakness and sensory changes, requires assistance for mobility, previously independent  diagnosis pending, ? conversion disorder vs neurologic pathology  workup in progress, anticipate patient would benefit from acute inpatient rehab, patient can tolerate 3 hrs/day of therapy with medical supervision.  will monitor for progress with bedside therapy Lumbar stenosis

## 2021-09-14 ENCOUNTER — NON-APPOINTMENT (OUTPATIENT)
Age: 66
End: 2021-09-14

## 2021-09-27 ENCOUNTER — APPOINTMENT (OUTPATIENT)
Dept: BREAST CENTER | Facility: CLINIC | Age: 66
End: 2021-09-27
Payer: MEDICARE

## 2021-09-27 VITALS
WEIGHT: 260 LBS | BODY MASS INDEX: 44.39 KG/M2 | SYSTOLIC BLOOD PRESSURE: 120 MMHG | HEART RATE: 102 BPM | HEIGHT: 64 IN | DIASTOLIC BLOOD PRESSURE: 83 MMHG

## 2021-09-27 DIAGNOSIS — Z80.3 FAMILY HISTORY OF MALIGNANT NEOPLASM OF BREAST: ICD-10-CM

## 2021-09-27 DIAGNOSIS — D24.2 BENIGN NEOPLASM OF LEFT BREAST: ICD-10-CM

## 2021-09-27 PROCEDURE — 99204 OFFICE O/P NEW MOD 45 MIN: CPT

## 2021-09-27 RX ORDER — MUPIROCIN CALCIUM 20 MG/G
2 OINTMENT TOPICAL
Qty: 1 | Refills: 0 | Status: DISCONTINUED | COMMUNITY
Start: 2019-02-04 | End: 2021-09-27

## 2021-09-29 ENCOUNTER — APPOINTMENT (OUTPATIENT)
Dept: PHYSICAL MEDICINE AND REHAB | Facility: CLINIC | Age: 66
End: 2021-09-29
Payer: MEDICARE

## 2021-09-29 VITALS — WEIGHT: 260 LBS | RESPIRATION RATE: 16 BRPM | HEIGHT: 64 IN | BODY MASS INDEX: 44.39 KG/M2

## 2021-09-29 DIAGNOSIS — M47.817 SPONDYLOSIS W/OUT MYELOPATHY OR RADICULOPATHY, LUMBOSACRAL REGION: ICD-10-CM

## 2021-09-29 PROCEDURE — 99203 OFFICE O/P NEW LOW 30 MIN: CPT

## 2021-09-29 NOTE — DATA REVIEWED
[MRI] : MRI [FreeTextEntry1] : MRI [facility] [id number]: L5-S1 Laminectomy defect and previously demonstrated left paracentral disc herniation is significantly diminished in size. Pre-existing disc bulge is redemonstrated. Bilateral facet arthropathy. L4-L5 facet hypertrophy. L3-L4 facet hypertrophy.

## 2021-09-29 NOTE — PHYSICAL EXAM
[FreeTextEntry1] : LUMBAR\par GEN: AAOx3. NAD. Obese.\par LS ROM: Flexion, extension, side-bending, rotation, oblique extension full (+) axial LB Sx.\par HIP ROM: Full and pain B/L.\par PALP: (+) TTP Diffusely B-paraspinals/SIJ/GTB. \par INSP: Spine alignment is midline, with no evidence of scoliosis.\par STRENGTH: 5/5 bilateral hip flexors, knee extensors, ankle dorsiflexors, long toe extensors, ankle plantar flexors, hip abductors.\par SENS: Grossly intact to LT BLE.\par REFLEXES: Symmetric patella, achilles. \par TONE: Normal, No clonus.\par STANCE: No Trendelenburg with single leg stance.\par GAIT: Non antalgic, normal reciprocating heel to toe\par SPECIAL: SLR and Slump (-) bilaterally. FADIR/MIRIAM (-) B/L.

## 2021-09-29 NOTE — ASSESSMENT
[FreeTextEntry1] : Impression:\par 1. Lumbago/Myofascial Pain\par 2. Lumbar Facet Arthrosis\par \par Plan: The history, physical examination, and imaging were reviewed. The diagnosis was discussed in detail with the patient. We discussed all the potential treatment options including physical therapy, oral medication, interventional spine procedures, and surgery; as well as alternative therapeutics such as acupuncture and massage. We also discussed the importance of weight loss in the long term management of the condition. The patient states that she was previously managed with medication, including Oxycodone, and spine injections and does not wish to pursue any treatment that does not include narcotic pain medication. I explained that we do not prescribe chronic narcotics in our practice and she requested to be referred to a pain management physician that will do both. I have given her a referral to Pain Management along with a referral for a new MRI LSpine. The patient expressed verbal understanding and is in agreement with the plan of care. All of the patient's questions and concerns were addressed during today's visit.\par

## 2021-09-29 NOTE — HISTORY OF PRESENT ILLNESS
[FreeTextEntry1] : Ms. DEBBI PADILLA is a very pleasant 66 year female who comes in for evaluation of Lower back pain that has been ongoing for years without any specific injury or inciting event. Patient has been treated by another pain physician with medication,PT, Injections Dr. Erazo which helped minimally. The pain is located primarily on her BILATERAL Lower back radiating to BILATERAL LEGS intermittent and described as dull, tightness and sharp. The pain is rated as 7/10 and ranges from 6-10/10. The patient's symptoms are aggravated by and alleviated by bending, standing, prolonged walking rest and no movement . The patient is Retired. The patient denies any night pain, numbness/tingling, weakness, or bowel/bladder dysfunction. The patient has no other complaints at this time.\par

## 2021-10-04 ENCOUNTER — OUTPATIENT (OUTPATIENT)
Dept: OUTPATIENT SERVICES | Facility: HOSPITAL | Age: 66
LOS: 1 days | End: 2021-10-04
Payer: MEDICARE

## 2021-10-04 ENCOUNTER — RESULT REVIEW (OUTPATIENT)
Age: 66
End: 2021-10-04

## 2021-10-04 DIAGNOSIS — Z98.890 OTHER SPECIFIED POSTPROCEDURAL STATES: Chronic | ICD-10-CM

## 2021-10-04 DIAGNOSIS — Z96.651 PRESENCE OF RIGHT ARTIFICIAL KNEE JOINT: Chronic | ICD-10-CM

## 2021-10-04 DIAGNOSIS — D25.9 LEIOMYOMA OF UTERUS, UNSPECIFIED: Chronic | ICD-10-CM

## 2021-10-04 DIAGNOSIS — D24.2 BENIGN NEOPLASM OF LEFT BREAST: ICD-10-CM

## 2021-10-04 DIAGNOSIS — Z96.652 PRESENCE OF LEFT ARTIFICIAL KNEE JOINT: Chronic | ICD-10-CM

## 2021-10-04 LAB — SURGICAL PATHOLOGY STUDY: SIGNIFICANT CHANGE UP

## 2021-10-04 PROCEDURE — 88321 CONSLTJ&REPRT SLD PREP ELSWR: CPT

## 2021-12-15 ENCOUNTER — APPOINTMENT (OUTPATIENT)
Dept: PHYSICAL MEDICINE AND REHAB | Facility: CLINIC | Age: 66
End: 2021-12-15
Payer: MEDICARE

## 2021-12-15 DIAGNOSIS — M48.061 SPINAL STENOSIS, LUMBAR REGION WITHOUT NEUROGENIC CLAUDICATION: ICD-10-CM

## 2021-12-15 PROCEDURE — 99442: CPT

## 2022-01-13 PROBLEM — M48.061 SPINAL STENOSIS, LUMBAR: Status: ACTIVE | Noted: 2018-11-27

## 2022-01-18 ENCOUNTER — NON-APPOINTMENT (OUTPATIENT)
Age: 67
End: 2022-01-18

## 2022-03-12 NOTE — OCCUPATIONAL THERAPY INITIAL EVALUATION ADULT - MANUAL MUSCLE TESTING RESULTS, REHAB EVAL
None
2/2 spinal precautions and c/o of lower back pain, however b/l UE observed to be at least 4/5 as obtained through functional assessment during transfers, b/l  5/5/grossly assessed due to

## 2022-05-09 ENCOUNTER — NON-APPOINTMENT (OUTPATIENT)
Age: 67
End: 2022-05-09